# Patient Record
Sex: MALE | Race: WHITE | Employment: FULL TIME | URBAN - METROPOLITAN AREA
[De-identification: names, ages, dates, MRNs, and addresses within clinical notes are randomized per-mention and may not be internally consistent; named-entity substitution may affect disease eponyms.]

---

## 2018-01-20 ENCOUNTER — HOSPITAL ENCOUNTER (EMERGENCY)
Age: 43
Discharge: HOME | End: 2018-01-20
Attending: EMERGENCY MEDICINE
Payer: COMMERCIAL

## 2018-01-20 ENCOUNTER — APPOINTMENT (OUTPATIENT)
Dept: GENERAL RADIOLOGY | Age: 43
End: 2018-01-20
Attending: EMERGENCY MEDICINE
Payer: COMMERCIAL

## 2018-01-20 VITALS
WEIGHT: 291.23 LBS | BODY MASS INDEX: 35.46 KG/M2 | RESPIRATION RATE: 18 BRPM | HEIGHT: 76 IN | HEART RATE: 85 BPM | SYSTOLIC BLOOD PRESSURE: 135 MMHG | DIASTOLIC BLOOD PRESSURE: 94 MMHG | OXYGEN SATURATION: 97 % | TEMPERATURE: 97.9 F

## 2018-01-20 DIAGNOSIS — K13.79 UVULAR EDEMA: Primary | ICD-10-CM

## 2018-01-20 DIAGNOSIS — J02.9 PHARYNGITIS, UNSPECIFIED ETIOLOGY: ICD-10-CM

## 2018-01-20 LAB
ALBUMIN SERPL-MCNC: 3.8 G/DL (ref 3.5–5)
ALBUMIN/GLOB SERPL: 0.9 {RATIO} (ref 1.1–2.2)
ALP SERPL-CCNC: 81 U/L (ref 45–117)
ALT SERPL-CCNC: 52 U/L (ref 12–78)
ANION GAP SERPL CALC-SCNC: 6 MMOL/L (ref 5–15)
AST SERPL-CCNC: 20 U/L (ref 15–37)
BASOPHILS # BLD: 0 K/UL (ref 0–0.1)
BASOPHILS NFR BLD: 0 % (ref 0–1)
BILIRUB SERPL-MCNC: 0.4 MG/DL (ref 0.2–1)
BUN SERPL-MCNC: 17 MG/DL (ref 6–20)
BUN/CREAT SERPL: 19 (ref 12–20)
CALCIUM SERPL-MCNC: 9.1 MG/DL (ref 8.5–10.1)
CHLORIDE SERPL-SCNC: 104 MMOL/L (ref 97–108)
CO2 SERPL-SCNC: 28 MMOL/L (ref 21–32)
CREAT SERPL-MCNC: 0.91 MG/DL (ref 0.7–1.3)
D DIMER PPP FEU-MCNC: <0.17 MG/L FEU (ref 0–0.65)
EOSINOPHIL # BLD: 0.1 K/UL (ref 0–0.4)
EOSINOPHIL NFR BLD: 1 % (ref 0–7)
ERYTHROCYTE [DISTWIDTH] IN BLOOD BY AUTOMATED COUNT: 13.6 % (ref 11.5–14.5)
GLOBULIN SER CALC-MCNC: 4.1 G/DL (ref 2–4)
GLUCOSE SERPL-MCNC: 102 MG/DL (ref 65–100)
HCT VFR BLD AUTO: 42.6 % (ref 36.6–50.3)
HGB BLD-MCNC: 14.5 G/DL (ref 12.1–17)
LYMPHOCYTES # BLD: 1.4 K/UL (ref 0.8–3.5)
LYMPHOCYTES NFR BLD: 20 % (ref 12–49)
MCH RBC QN AUTO: 30.3 PG (ref 26–34)
MCHC RBC AUTO-ENTMCNC: 34 G/DL (ref 30–36.5)
MCV RBC AUTO: 89.1 FL (ref 80–99)
MONOCYTES # BLD: 0.5 K/UL (ref 0–1)
MONOCYTES NFR BLD: 8 % (ref 5–13)
NEUTS SEG # BLD: 4.9 K/UL (ref 1.8–8)
NEUTS SEG NFR BLD: 71 % (ref 32–75)
PLATELET # BLD AUTO: 233 K/UL (ref 150–400)
POTASSIUM SERPL-SCNC: 4.2 MMOL/L (ref 3.5–5.1)
PROT SERPL-MCNC: 7.9 G/DL (ref 6.4–8.2)
RBC # BLD AUTO: 4.78 M/UL (ref 4.1–5.7)
SODIUM SERPL-SCNC: 138 MMOL/L (ref 136–145)
WBC # BLD AUTO: 7 K/UL (ref 4.1–11.1)

## 2018-01-20 PROCEDURE — 36415 COLL VENOUS BLD VENIPUNCTURE: CPT | Performed by: EMERGENCY MEDICINE

## 2018-01-20 PROCEDURE — 80053 COMPREHEN METABOLIC PANEL: CPT | Performed by: EMERGENCY MEDICINE

## 2018-01-20 PROCEDURE — 71046 X-RAY EXAM CHEST 2 VIEWS: CPT

## 2018-01-20 PROCEDURE — 74011250636 HC RX REV CODE- 250/636: Performed by: EMERGENCY MEDICINE

## 2018-01-20 PROCEDURE — 85025 COMPLETE CBC W/AUTO DIFF WBC: CPT | Performed by: EMERGENCY MEDICINE

## 2018-01-20 PROCEDURE — 96375 TX/PRO/DX INJ NEW DRUG ADDON: CPT

## 2018-01-20 PROCEDURE — 99282 EMERGENCY DEPT VISIT SF MDM: CPT

## 2018-01-20 PROCEDURE — 96374 THER/PROPH/DIAG INJ IV PUSH: CPT

## 2018-01-20 PROCEDURE — 70360 X-RAY EXAM OF NECK: CPT

## 2018-01-20 PROCEDURE — 85379 FIBRIN DEGRADATION QUANT: CPT | Performed by: EMERGENCY MEDICINE

## 2018-01-20 RX ORDER — DEXAMETHASONE SODIUM PHOSPHATE 10 MG/ML
10 INJECTION INTRAMUSCULAR; INTRAVENOUS
Status: COMPLETED | OUTPATIENT
Start: 2018-01-20 | End: 2018-01-20

## 2018-01-20 RX ORDER — PREDNISONE 10 MG/1
60 TABLET ORAL
Qty: 27 TAB | Refills: 0 | Status: SHIPPED | OUTPATIENT
Start: 2018-01-20 | End: 2021-05-21

## 2018-01-20 RX ORDER — KETOROLAC TROMETHAMINE 30 MG/ML
30 INJECTION, SOLUTION INTRAMUSCULAR; INTRAVENOUS
Status: COMPLETED | OUTPATIENT
Start: 2018-01-20 | End: 2018-01-20

## 2018-01-20 RX ORDER — AMOXICILLIN AND CLAVULANATE POTASSIUM 875; 125 MG/1; MG/1
1 TABLET, FILM COATED ORAL 2 TIMES DAILY
Qty: 7 TAB | Refills: 0 | Status: SHIPPED | OUTPATIENT
Start: 2018-01-20 | End: 2018-01-27

## 2018-01-20 RX ORDER — DIPHENHYDRAMINE HYDROCHLORIDE 50 MG/ML
25 INJECTION, SOLUTION INTRAMUSCULAR; INTRAVENOUS
Status: COMPLETED | OUTPATIENT
Start: 2018-01-20 | End: 2018-01-20

## 2018-01-20 RX ADMIN — KETOROLAC TROMETHAMINE 30 MG: 30 INJECTION, SOLUTION INTRAMUSCULAR at 08:30

## 2018-01-20 RX ADMIN — DEXAMETHASONE SODIUM PHOSPHATE 10 MG: 10 INJECTION, SOLUTION INTRAMUSCULAR; INTRAVENOUS at 07:40

## 2018-01-20 RX ADMIN — DIPHENHYDRAMINE HYDROCHLORIDE 25 MG: 50 INJECTION, SOLUTION INTRAMUSCULAR; INTRAVENOUS at 08:30

## 2018-01-20 NOTE — ED PROVIDER NOTES
EMERGENCY DEPARTMENT HISTORY AND PHYSICAL EXAM      Date: 1/20/2018  Patient Name: Steve Paul    History of Presenting Illness     Chief Complaint   Patient presents with    Cough     x today with red streaks noted Pt states he drank [place] P[employer] because he was trying to think of something that would clear his airway. Patient doesn't drink normally    Cold Symptoms     Congestion in the past 2 weeks       History Provided By: Patient    HPI: Steve Paul, 43 y.o. male with PMHx significant for GERD, anxiety, and hypercholesterolemia, presents ambulatory to the ED with cc of cough with significant blood in sputum, rhinorrhea and SOB since waking up this morning with a dry mouth. Pt denies a hx of similar symptoms, however notes visualizing mildly blood tinged mucous from [employer] drainage intermittently. He reports having electric heat in the house, and he notes his concerns for its association with his symptoms. Pt also notes some mild ear drainage bilaterally last night. He notes drinking a sip of bourbon this morning in an attempt to relieve his symptoms. He denies any daily alcohol use, or tobacco use. Pt endorses being well hydrated. Pt denies any recent unusual bruising, or a hx of GI problems. He endorses a close fhx of esophageal CA and hypertension. Pt denies any recent dental work. He denies any hx of PE/DVT, hormone replacement therapy, prolonged immobilization, or recent surgeries. Pt specifically denies nay abdominal pain, fever, or chest pain. PCP: Constance Odonnell NP    There are no other complaints, changes, or physical findings at this time.         Past History     Past Medical History:  Past Medical History:   Diagnosis Date    Anxiety     Depression     depression and adhd    GERD (gastroesophageal reflux disease)     Hypercholesterolemia        Past Surgical History:  Past Surgical History:   Procedure Laterality Date    HX VASECTOMY      2003       Family History:  Family History   Problem Relation Age of Onset    Cancer Father     Elevated Lipids Mother     Cancer Maternal Grandfather        Social History:  Social History   Substance Use Topics    Smoking status: Never Smoker    Smokeless tobacco: Never Used    Alcohol use 0.6 oz/week     1 Shots of liquor per week      Comment: social       Allergies:  No Known Allergies      Review of Systems   Review of Systems   HENT: Positive for rhinorrhea. Respiratory: Positive for cough and shortness of breath. Cardiovascular: Negative for chest pain. Gastrointestinal: Negative for abdominal pain. All other systems reviewed and are negative. Physical Exam   Physical Exam  Vital signs and nursing notes reviewed    CONSTITUTIONAL: Alert, in mild distress; well-developed; well-nourished. musculature build  HEAD:  Normocephalic, atraumatic  EYES: PERRL; EOM's intact. ENTM: Nose: no rhinorrhea; Throat: no erythema or exudate, mucous membranes moist; no lip swelling or tongue swelling, no sublingual buccal or mucosal swelling in the mouth except for uvula  Neck:  Supple. trachea is midline. RESP: Chest clear, equal breath sounds. - W/R/R  CV: S1 and S2 WNL; No murmurs, gallops or rubs. 2+ radial and DP pulses bilaterally. GI: non-distended, normal bowel sounds, abdomen soft and non-tender. No masses or organomegaly. : No costo-vertebral angle tenderness. BACK:  Non-tender, normal appearance  UPPER EXT:  Normal inspection. no joint or soft tissue swelling  LOWER EXT: No edema, no calf tenderness. NEURO: Alert and oriented x3, 5/5 strength and light touch sensation intact in bilateral upper and lower extremities. SKIN: No rashes;  Warm and dry  PSYCH: Normal mood, normal affect    Diagnostic Study Results     Labs -     Recent Results (from the past 12 hour(s))   CBC WITH AUTOMATED DIFF    Collection Time: 01/20/18  6:54 AM   Result Value Ref Range    WBC 7.0 4.1 - 11.1 K/uL    RBC 4.78 4.10 - 5.70 M/uL    HGB 14.5 12.1 - 17.0 g/dL    HCT 42.6 36.6 - 50.3 %    MCV 89.1 80.0 - 99.0 FL    MCH 30.3 26.0 - 34.0 PG    MCHC 34.0 30.0 - 36.5 g/dL    RDW 13.6 11.5 - 14.5 %    PLATELET 530 146 - 712 K/uL    NEUTROPHILS 71 32 - 75 %    LYMPHOCYTES 20 12 - 49 %    MONOCYTES 8 5 - 13 %    EOSINOPHILS 1 0 - 7 %    BASOPHILS 0 0 - 1 %    ABS. NEUTROPHILS 4.9 1.8 - 8.0 K/UL    ABS. LYMPHOCYTES 1.4 0.8 - 3.5 K/UL    ABS. MONOCYTES 0.5 0.0 - 1.0 K/UL    ABS. EOSINOPHILS 0.1 0.0 - 0.4 K/UL    ABS. BASOPHILS 0.0 0.0 - 0.1 K/UL   METABOLIC PANEL, COMPREHENSIVE    Collection Time: 01/20/18  6:54 AM   Result Value Ref Range    Sodium 138 136 - 145 mmol/L    Potassium 4.2 3.5 - 5.1 mmol/L    Chloride 104 97 - 108 mmol/L    CO2 28 21 - 32 mmol/L    Anion gap 6 5 - 15 mmol/L    Glucose 102 (H) 65 - 100 mg/dL    BUN 17 6 - 20 MG/DL    Creatinine 0.91 0.70 - 1.30 MG/DL    BUN/Creatinine ratio 19 12 - 20      GFR est AA >60 >60 ml/min/1.73m2    GFR est non-AA >60 >60 ml/min/1.73m2    Calcium 9.1 8.5 - 10.1 MG/DL    Bilirubin, total 0.4 0.2 - 1.0 MG/DL    ALT (SGPT) 52 12 - 78 U/L    AST (SGOT) 20 15 - 37 U/L    Alk. phosphatase 81 45 - 117 U/L    Protein, total 7.9 6.4 - 8.2 g/dL    Albumin 3.8 3.5 - 5.0 g/dL    Globulin 4.1 (H) 2.0 - 4.0 g/dL    A-G Ratio 0.9 (L) 1.1 - 2.2     D DIMER    Collection Time: 01/20/18  6:57 AM   Result Value Ref Range    D-dimer <0.17 0.00 - 0.65 mg/L FEU       Radiologic Studies -     CXR Results  (Last 48 hours)               01/20/18 0648  XR CHEST PA LAT Final result    Impression:  IMPRESSION:   No acute process. Narrative:  INDICATION:   acute cough with red streaks in it; eval for infiltrate       COMPARISON: None       FINDINGS:       Frontal and lateral views of the chest demonstrate a normal cardiomediastinal   silhouette. The lungs are adequately expanded. There is no edema, effusion,   consolidation, or pneumothorax. The osseous structures are unremarkable.                    Medical Decision Making   I am the first provider for this patient. I reviewed the vital signs, available nursing notes, past medical history, past surgical history, family history and social history. Vital Signs-Reviewed the patient's vital signs. Patient Vitals for the past 12 hrs:   Temp Pulse Resp BP SpO2   01/20/18 0653 - - - - 95 %   01/20/18 0649 - - - (!) 135/94 -   01/20/18 0634 97.9 °F (36.6 °C) 85 18 (!) 135/94 98 %       Pulse Oximetry Analysis - 98% on room air    Cardiac Monitor:   Rate: 86 bpm    Records Reviewed: Nursing Notes and Old Medical Records    Provider Notes (Medical Decision Making):   42 yo male with report of coughing up lots of blood in a setting of recent dry mouth at night here with no fever hypotension, tachycardia, tachypnea or respiratory distress. Patient without acute nose bleed or visible bleeding in the oral pharyngis, or uvula and posterior oral pharynx appears irritated possibly from dry air and forced heat in home. No evidence of airway compromise or new bleeding on exam. Patient has no history of GI bleed or other GI problems to suggest GI cause. No evidence of PNA. Reassuring vitals and low D-dimer for PE. Plan of discharge for patient. Recommend humidifier to use for dry oropharyngeal and nasopharyngeal  symptoms. Discussed return precautions with patient and his wife. ED Course:   7:01 AM  Initial assessment performed. The patients presenting problems have been discussed, and they are in agreement with the care plan formulated and outlined with them. I have encouraged them to ask questions as they arise throughout their visit. 8:58 AM  Re-examed uvula no change in size continues to appear inflamed, pt is not in respiratory distressed, still has a sense of fullness of oropharynx, no visible blood, sending for soft tissue of the neck to evaluated for edema of the epiglottis.     Disposition:  DISCHARGE NOTE:  9:24 AM  The patient has been re-evaluated and is ready for discharge. Reviewed available results with patient. Counseled patient on diagnosis and care plan. Patient has expressed understanding, and all questions have been answered. Patient agrees with plan and agrees to follow up as recommended, or return to the ED if their symptoms worsen. Discharge instructions have been provided and explained to the patient, along with reasons to return to the ED. PLAN:  1. There are no discharge medications for this patient. 2.   Follow-up Information     Follow up With Details Comments Suman Vasquez NP In 2 days for re-evaluation. [address] [place] [address]  [phone]      [facility] EMERGENCY DEPT  If symptoms worsen including new coughing of blood, new uncontrolled nosebleed, difficulty breathing, sensation of throat closing up. [address] [place] [address]  [phone]        Return to ED if worse     Diagnosis     Clinical Impression:   1. Uvular edema    2. Pharyngitis, unspecified etiology        Attestations:  ATTESTATION:  This note is prepared by Migue Humphries, acting as Scribe for Shawn Erickson MD.     Shawn Erickson MD: The scribe's documentation has been prepared under my direction and personally reviewed by me in its entirety. I confirm that the note above accurately reflects all work, treatment, procedures, and medical decision making performed by me.

## 2018-01-20 NOTE — ED NOTES
Discharge instructions reviewed w/ pt and copy given by Dr. Eddi Brian. Pt discharged ambulatory from ED to care of wife.

## 2018-01-20 NOTE — LETTER
[address] 
[facility] EMERGENCY DEPT 
[address] [place] [address] [address] [id number] 
[phone] Work/School Note Date: 1/20/2018 To Whom It May concern: 
 
Drew Hurtado was seen and treated today in the emergency room by the following provider(s): 
Attending Provider: Anastacia Bergeron MD. Drew Hurtado Please excuse from work on 1/20/18 and 1/21/18. Sincerely, Anastacia Bergeron MD

## 2021-05-19 ENCOUNTER — HOSPITAL ENCOUNTER (EMERGENCY)
Age: 46
Discharge: HOME | End: 2021-05-19
Attending: STUDENT IN AN ORGANIZED HEALTH CARE EDUCATION/TRAINING PROGRAM
Payer: COMMERCIAL

## 2021-05-19 ENCOUNTER — APPOINTMENT (OUTPATIENT)
Dept: CT IMAGING | Age: 46
End: 2021-05-19
Attending: PHYSICIAN ASSISTANT
Payer: COMMERCIAL

## 2021-05-19 VITALS
WEIGHT: 292.77 LBS | RESPIRATION RATE: 16 BRPM | BODY MASS INDEX: 36.4 KG/M2 | HEART RATE: 98 BPM | DIASTOLIC BLOOD PRESSURE: 81 MMHG | SYSTOLIC BLOOD PRESSURE: 145 MMHG | HEIGHT: 75 IN | OXYGEN SATURATION: 96 % | TEMPERATURE: 98 F

## 2021-05-19 DIAGNOSIS — K52.9 GASTROENTERITIS, ACUTE: Primary | ICD-10-CM

## 2021-05-19 LAB
ALBUMIN SERPL-MCNC: 3.7 G/DL (ref 3.5–5)
ALBUMIN/GLOB SERPL: 0.8 {RATIO} (ref 1.1–2.2)
ALP SERPL-CCNC: 68 U/L (ref 45–117)
ALT SERPL-CCNC: 51 U/L (ref 12–78)
ANION GAP SERPL CALC-SCNC: 2 MMOL/L (ref 5–15)
APPEARANCE UR: CLEAR
AST SERPL-CCNC: 24 U/L (ref 15–37)
BACTERIA URNS QL MICRO: NEGATIVE /HPF
BASOPHILS # BLD: 0 K/UL (ref 0–0.1)
BASOPHILS NFR BLD: 0 % (ref 0–1)
BILIRUB SERPL-MCNC: 0.8 MG/DL (ref 0.2–1)
BILIRUB UR QL CFM: NEGATIVE
BUN SERPL-MCNC: 11 MG/DL (ref 6–20)
BUN/CREAT SERPL: 9 (ref 12–20)
CALCIUM SERPL-MCNC: 9.6 MG/DL (ref 8.5–10.1)
CHLORIDE SERPL-SCNC: 101 MMOL/L (ref 97–108)
CO2 SERPL-SCNC: 32 MMOL/L (ref 21–32)
COLOR UR: ABNORMAL
CREAT SERPL-MCNC: 1.22 MG/DL (ref 0.7–1.3)
DIFFERENTIAL METHOD BLD: ABNORMAL
EOSINOPHIL # BLD: 0.2 K/UL (ref 0–0.4)
EOSINOPHIL NFR BLD: 1 % (ref 0–7)
EPITH CASTS URNS QL MICRO: ABNORMAL /LPF
ERYTHROCYTE [DISTWIDTH] IN BLOOD BY AUTOMATED COUNT: 15.4 % (ref 11.5–14.5)
GLOBULIN SER CALC-MCNC: 4.5 G/DL (ref 2–4)
GLUCOSE SERPL-MCNC: 104 MG/DL (ref 65–100)
GLUCOSE UR STRIP.AUTO-MCNC: NEGATIVE MG/DL
HCT VFR BLD AUTO: 53.6 % (ref 36.6–50.3)
HGB BLD-MCNC: 17.9 G/DL (ref 12.1–17)
HGB UR QL STRIP: ABNORMAL
HYALINE CASTS URNS QL MICRO: ABNORMAL /LPF (ref 0–5)
IMM GRANULOCYTES # BLD AUTO: 0 K/UL (ref 0–0.04)
IMM GRANULOCYTES NFR BLD AUTO: 0 % (ref 0–0.5)
KETONES UR QL STRIP.AUTO: ABNORMAL MG/DL
LEUKOCYTE ESTERASE UR QL STRIP.AUTO: NEGATIVE
LIPASE SERPL-CCNC: 43 U/L (ref 73–393)
LYMPHOCYTES # BLD: 0.6 K/UL (ref 0.8–3.5)
LYMPHOCYTES NFR BLD: 4 % (ref 12–49)
MCH RBC QN AUTO: 30.7 PG (ref 26–34)
MCHC RBC AUTO-ENTMCNC: 33.4 G/DL (ref 30–36.5)
MCV RBC AUTO: 91.9 FL (ref 80–99)
MONOCYTES # BLD: 1.1 K/UL (ref 0–1)
MONOCYTES NFR BLD: 7 % (ref 5–13)
NEUTS SEG # BLD: 13.4 K/UL (ref 1.8–8)
NEUTS SEG NFR BLD: 88 % (ref 32–75)
NITRITE UR QL STRIP.AUTO: NEGATIVE
NRBC # BLD: 0 K/UL (ref 0–0.01)
NRBC BLD-RTO: 0 PER 100 WBC
PH UR STRIP: 5.5 [PH] (ref 5–8)
PLATELET # BLD AUTO: 218 K/UL (ref 150–400)
PMV BLD AUTO: 10.5 FL (ref 8.9–12.9)
POTASSIUM SERPL-SCNC: 4.3 MMOL/L (ref 3.5–5.1)
PROT SERPL-MCNC: 8.2 G/DL (ref 6.4–8.2)
PROT UR STRIP-MCNC: 30 MG/DL
RBC # BLD AUTO: 5.83 M/UL (ref 4.1–5.7)
RBC #/AREA URNS HPF: ABNORMAL /HPF (ref 0–5)
RBC MORPH BLD: ABNORMAL
SODIUM SERPL-SCNC: 135 MMOL/L (ref 136–145)
SP GR UR REFRACTOMETRY: 1.03 (ref 1–1.03)
TROPONIN I SERPL-MCNC: <0.05 NG/ML
UA: UC IF INDICATED,UAUC: ABNORMAL
UROBILINOGEN UR QL STRIP.AUTO: 1 EU/DL (ref 0.2–1)
WBC # BLD AUTO: 15.3 K/UL (ref 4.1–11.1)
WBC MORPH BLD: ABNORMAL
WBC URNS QL MICRO: ABNORMAL /HPF (ref 0–4)

## 2021-05-19 PROCEDURE — 74011250636 HC RX REV CODE- 250/636: Performed by: PHYSICIAN ASSISTANT

## 2021-05-19 PROCEDURE — 96375 TX/PRO/DX INJ NEW DRUG ADDON: CPT

## 2021-05-19 PROCEDURE — 74177 CT ABD & PELVIS W/CONTRAST: CPT

## 2021-05-19 PROCEDURE — 96374 THER/PROPH/DIAG INJ IV PUSH: CPT

## 2021-05-19 PROCEDURE — 36415 COLL VENOUS BLD VENIPUNCTURE: CPT

## 2021-05-19 PROCEDURE — 80053 COMPREHEN METABOLIC PANEL: CPT

## 2021-05-19 PROCEDURE — 74011000636 HC RX REV CODE- 636: Performed by: STUDENT IN AN ORGANIZED HEALTH CARE EDUCATION/TRAINING PROGRAM

## 2021-05-19 PROCEDURE — 99283 EMERGENCY DEPT VISIT LOW MDM: CPT

## 2021-05-19 PROCEDURE — 84484 ASSAY OF TROPONIN QUANT: CPT

## 2021-05-19 PROCEDURE — 81001 URINALYSIS AUTO W/SCOPE: CPT

## 2021-05-19 PROCEDURE — 85025 COMPLETE CBC W/AUTO DIFF WBC: CPT

## 2021-05-19 PROCEDURE — 96361 HYDRATE IV INFUSION ADD-ON: CPT

## 2021-05-19 PROCEDURE — 83690 ASSAY OF LIPASE: CPT

## 2021-05-19 RX ORDER — SODIUM CHLORIDE 9 MG/ML
1000 INJECTION, SOLUTION INTRAVENOUS ONCE
Status: COMPLETED | OUTPATIENT
Start: 2021-05-19 | End: 2021-05-19

## 2021-05-19 RX ORDER — DICYCLOMINE HYDROCHLORIDE 20 MG/1
20 TABLET ORAL EVERY 6 HOURS
Qty: 20 TABLET | Refills: 0 | OUTPATIENT
Start: 2021-05-19 | End: 2022-03-21

## 2021-05-19 RX ORDER — KETOROLAC TROMETHAMINE 30 MG/ML
15 INJECTION, SOLUTION INTRAMUSCULAR; INTRAVENOUS
Status: COMPLETED | OUTPATIENT
Start: 2021-05-19 | End: 2021-05-19

## 2021-05-19 RX ORDER — ONDANSETRON 2 MG/ML
4 INJECTION INTRAMUSCULAR; INTRAVENOUS
Status: COMPLETED | OUTPATIENT
Start: 2021-05-19 | End: 2021-05-19

## 2021-05-19 RX ORDER — ONDANSETRON 4 MG/1
4 TABLET, ORALLY DISINTEGRATING ORAL
Qty: 20 TABLET | Refills: 0 | Status: SHIPPED | OUTPATIENT
Start: 2021-05-19

## 2021-05-19 RX ADMIN — SODIUM CHLORIDE 1000 ML: 9 INJECTION, SOLUTION INTRAVENOUS at 09:51

## 2021-05-19 RX ADMIN — IOPAMIDOL 100 ML: 755 INJECTION, SOLUTION INTRAVENOUS at 10:36

## 2021-05-19 RX ADMIN — ONDANSETRON 4 MG: 2 INJECTION INTRAMUSCULAR; INTRAVENOUS at 09:55

## 2021-05-19 RX ADMIN — KETOROLAC TROMETHAMINE 15 MG: 30 INJECTION, SOLUTION INTRAMUSCULAR at 09:54

## 2021-05-19 NOTE — ED PROVIDER NOTES
EMERGENCY DEPARTMENT HISTORY AND PHYSICAL EXAM      Date: 5/19/2021  Patient Name: Alannah Harvey    History of Presenting Illness     Chief Complaint   Patient presents with    Abdominal Pain     Patient started last night having diarrhea and abdomnal pain that has progressively gotten worse.  Diarrhea       History Provided By: Patient and Patient's Wife    HPI: Alannah Harvey, 39 y.o. male with PMHx significant for GERD, hypercholesterolemia, presents to the ED with cc of abdominal pain onset since last night. He describes it as intermittent, sharp pain that initially began in his pelvis and is now radiating into his upper abdomen. The pain comes on in waves. He has had associated nausea and nonbloody diarrhea. The pain is severe when present. It slightly improves after an episode of diarrhea. He denies fever, vomiting, urinary symptoms. He denies recent eating out or new food exposures. There are no other complaints, changes, or physical findings at this time. PCP: Traci Chaudhary NP    No current facility-administered medications on file prior to encounter. Current Outpatient Medications on File Prior to Encounter   Medication Sig Dispense Refill    predniSONE (DELTASONE) 10 mg tablet Take 6 Tabs by mouth daily (with breakfast).  Day 1 and 2: 60mg; Day 3: 50mg; Day 4:40mg; Day 5: 30 mg; Day 6: 20mg; Day 7: 10mg 27 Tab 0       Past History     Past Medical History:  Past Medical History:   Diagnosis Date    Anxiety     Depression     depression and adhd    GERD (gastroesophageal reflux disease)     Hypercholesterolemia        Past Surgical History:  Past Surgical History:   Procedure Laterality Date    HX VASECTOMY      2003       Family History:  Family History   Problem Relation Age of Onset    Cancer Father     Elevated Lipids Mother     Cancer Maternal Grandfather        Social History:  Social History     Tobacco Use    Smoking status: Never Smoker    Smokeless tobacco: Never Used   Substance Use Topics    Alcohol use: Yes     Alcohol/week: 1.0 standard drinks     Types: 1 Shots of liquor per week     Comment: social    Drug use: No       Allergies:  No Known Allergies      Review of Systems   Review of Systems   Constitutional: Negative for chills and fever. HENT: Negative for ear pain and sore throat. Eyes: Negative for redness and visual disturbance. Respiratory: Negative for cough and shortness of breath. Cardiovascular: Negative for chest pain and palpitations. Gastrointestinal: Positive for abdominal pain, diarrhea and nausea. Negative for blood in stool and vomiting. Genitourinary: Negative for dysuria and hematuria. Musculoskeletal: Negative for back pain and gait problem. Skin: Negative for rash and wound. Neurological: Negative for dizziness and headaches. Psychiatric/Behavioral: Negative for behavioral problems and confusion. All other systems reviewed and are negative. Physical Exam   Physical Exam  Constitutional:       Appearance: He is not toxic-appearing. Comments: Patient is slightly uncomfortable appearing. HENT:      Head: Normocephalic and atraumatic. Mouth/Throat:      Mouth: Mucous membranes are moist.   Eyes:      Extraocular Movements: Extraocular movements intact. Pupils: Pupils are equal, round, and reactive to light. Cardiovascular:      Rate and Rhythm: Normal rate and regular rhythm. Heart sounds: Normal heart sounds. No murmur heard. Pulmonary:      Effort: Pulmonary effort is normal. No respiratory distress. Breath sounds: Normal breath sounds. No wheezing, rhonchi or rales. Abdominal:      Comments: Abdomen is soft. There is tenderness palpation in the periumbilical and epigastric areas. No rebound or guarding. No CVA tenderness. Musculoskeletal:         General: No deformity. Normal range of motion. Cervical back: Normal range of motion and neck supple.    Skin: General: Skin is warm and dry. Neurological:      General: No focal deficit present. Mental Status: He is alert and oriented to person, place, and time. Psychiatric:         Behavior: Behavior normal.           Diagnostic Study Results     Labs -     Recent Results (from the past 12 hour(s))   URINALYSIS W/ REFLEX CULTURE    Collection Time: 05/19/21  8:33 AM    Specimen: Urine    Urine specimen   Result Value Ref Range    Color DARK YELLOW      Appearance CLEAR CLEAR      Specific gravity 1.027 1.003 - 1.030      pH (UA) 5.5 5.0 - 8.0      Protein 30 (A) NEG mg/dL    Glucose Negative NEG mg/dL    Ketone TRACE (A) NEG mg/dL    Blood MODERATE (A) NEG      Urobilinogen 1.0 0.2 - 1.0 EU/dL    Nitrites Negative NEG      Leukocyte Esterase Negative NEG      WBC 0-4 0 - 4 /hpf    RBC 5-10 0 - 5 /hpf    Epithelial cells FEW FEW /lpf    Bacteria Negative NEG /hpf    UA:UC IF INDICATED CULTURE NOT INDICATED BY UA RESULT CNI      Hyaline cast 0-2 0 - 5 /lpf   BILIRUBIN, CONFIRM    Collection Time: 05/19/21  8:33 AM   Result Value Ref Range    Bilirubin UA, confirm Negative NEG     CBC WITH AUTOMATED DIFF    Collection Time: 05/19/21  8:48 AM   Result Value Ref Range    WBC 15.3 (H) 4.1 - 11.1 K/uL    RBC 5.83 (H) 4.10 - 5.70 M/uL    HGB 17.9 (H) 12.1 - 17.0 g/dL    HCT 53.6 (H) 36.6 - 50.3 %    MCV 91.9 80.0 - 99.0 FL    MCH 30.7 26.0 - 34.0 PG    MCHC 33.4 30.0 - 36.5 g/dL    RDW 15.4 (H) 11.5 - 14.5 %    PLATELET 483 964 - 626 K/uL    MPV 10.5 8.9 - 12.9 FL    NRBC 0.0 0  WBC    ABSOLUTE NRBC 0.00 0.00 - 0.01 K/uL    NEUTROPHILS 88 (H) 32 - 75 %    LYMPHOCYTES 4 (L) 12 - 49 %    MONOCYTES 7 5 - 13 %    EOSINOPHILS 1 0 - 7 %    BASOPHILS 0 0 - 1 %    IMMATURE GRANULOCYTES 0 0.0 - 0.5 %    ABS. NEUTROPHILS 13.4 (H) 1.8 - 8.0 K/UL    ABS. LYMPHOCYTES 0.6 (L) 0.8 - 3.5 K/UL    ABS. MONOCYTES 1.1 (H) 0.0 - 1.0 K/UL    ABS. EOSINOPHILS 0.2 0.0 - 0.4 K/UL    ABS. BASOPHILS 0.0 0.0 - 0.1 K/UL    ABS. IMM. GRANS. 0.0 0.00 - 0.04 K/UL    DF SMEAR SCANNED      RBC COMMENTS ANISOCYTOSIS  1+        WBC COMMENTS TOXIC GRANULATION     METABOLIC PANEL, COMPREHENSIVE    Collection Time: 05/19/21  8:48 AM   Result Value Ref Range    Sodium 135 (L) 136 - 145 mmol/L    Potassium 4.3 3.5 - 5.1 mmol/L    Chloride 101 97 - 108 mmol/L    CO2 32 21 - 32 mmol/L    Anion gap 2 (L) 5 - 15 mmol/L    Glucose 104 (H) 65 - 100 mg/dL    BUN 11 6 - 20 MG/DL    Creatinine 1.22 0.70 - 1.30 MG/DL    BUN/Creatinine ratio 9 (L) 12 - 20      GFR est AA >60 >60 ml/min/1.73m2    GFR est non-AA >60 >60 ml/min/1.73m2    Calcium 9.6 8.5 - 10.1 MG/DL    Bilirubin, total 0.8 0.2 - 1.0 MG/DL    ALT (SGPT) 51 12 - 78 U/L    AST (SGOT) 24 15 - 37 U/L    Alk. phosphatase 68 45 - 117 U/L    Protein, total 8.2 6.4 - 8.2 g/dL    Albumin 3.7 3.5 - 5.0 g/dL    Globulin 4.5 (H) 2.0 - 4.0 g/dL    A-G Ratio 0.8 (L) 1.1 - 2.2     LIPASE    Collection Time: 05/19/21  8:48 AM   Result Value Ref Range    Lipase 43 (L) 73 - 393 U/L   TROPONIN I    Collection Time: 05/19/21  8:48 AM   Result Value Ref Range    Troponin-I, Qt. <0.05 <0.05 ng/mL       Radiologic Studies -   CT ABD PELV W CONT   Final Result   Mild splenomegaly. No acute abnormality. CT Results  (Last 48 hours)               05/19/21 1037  CT ABD PELV W CONT Final result    Impression:  Mild splenomegaly. No acute abnormality. Narrative:  EXAM: CT ABD PELV W CONT       INDICATION: intermittent severe abdominal pain with diarrhea and leukocytosis       COMPARISON: None        CONTRAST: 100 mL of Isovue-370. TECHNIQUE:    Following the uneventful intravenous administration of contrast, thin axial   images were obtained through the abdomen and pelvis. Coronal and sagittal   reconstructions were generated. Oral contrast was not administered. CT dose   reduction was achieved through use of a standardized protocol tailored for this   examination and automatic exposure control for dose modulation. FINDINGS:    LOWER THORAX: No significant abnormality in the incidentally imaged lower chest.   LIVER: No mass. BILIARY TREE: Gallbladder is within normal limits. CBD is not dilated. SPLEEN: The spleen is mildly enlarged, maximum transverse dimension 14.1 cm. PANCREAS: No mass or ductal dilatation. ADRENALS: Unremarkable. KIDNEYS: No mass, calculus, or hydronephrosis. STOMACH: Unremarkable. SMALL BOWEL: No dilatation or wall thickening. COLON: No dilatation or wall thickening. APPENDIX: Within normal limits. PERITONEUM: No ascites or pneumoperitoneum. RETROPERITONEUM: No lymphadenopathy or aortic aneurysm. REPRODUCTIVE ORGANS: There is no pelvic mass or lymphadenopathy. URINARY BLADDER: No mass or calculus. BONES: No destructive bone lesion. ABDOMINAL WALL: No mass or hernia. ADDITIONAL COMMENTS: N/A               CXR Results  (Last 48 hours)    None            Medical Decision Making   I am the first provider for this patient. I reviewed the vital signs, available nursing notes, past medical history, past surgical history, family history and social history. Vital Signs-Reviewed the patient's vital signs. Patient Vitals for the past 12 hrs:   Temp Pulse Resp BP SpO2   05/19/21 0827 98 °F (36.7 °C) 98 16 (!) 145/81 96 %         Records Reviewed: Nursing Notes, Old Medical Records, Previous Radiology Studies and Previous Laboratory Studies      Provider Notes (Medical Decision Making):   DDx: Gastroenteritis, infectious colitis, diverticulitis, appendicitis, dehydration, electrolyte abnormality    Plan to obtain labs, CT abdomen/pelvis for further evaluation. Will treat with IV fluids, antiemetic, and analgesia. ED Course:   Initial assessment performed. The patients presenting problems have been discussed, and they are in agreement with the care plan formulated and outlined with them. I have encouraged them to ask questions as they arise throughout their visit. 11:25 AM -CT shows mild splenomegaly but no acute abnormality. Patient was treated symptomatically with IV fluids, antiemetic, and analgesia with improvement in symptoms. He felt better on recheck. Advised BRAT diet and supportive treatment at home. Advised follow up with PCP for a recheck and stool studies if symptoms continue and discussed return precautions. Disposition:  11:28 AM  The patient has been re-evaluated and is ready for discharge. Reviewed available results with patient. Counseled patient on diagnosis and care plan. Patient has expressed understanding, and all questions have been answered. Patient agrees with plan and agrees to follow up as recommended, or to return to the ED if their symptoms worsen. Discharge instructions have been provided and explained to the patient, along with reasons to return to the ED. PLAN:  1. Discharge Medication List as of 5/19/2021 11:28 AM      START taking these medications    Details   ondansetron (Zofran ODT) 4 mg disintegrating tablet 1 Tablet by SubLINGual route every eight (8) hours as needed for Nausea or Vomiting., Normal, Disp-20 Tablet, R-0      dicyclomine (BENTYL) 20 mg tablet Take 1 Tablet by mouth every six (6) hours. , Normal, Disp-20 Tablet, R-0         CONTINUE these medications which have NOT CHANGED    Details   predniSONE (DELTASONE) 10 mg tablet Take 6 Tabs by mouth daily (with breakfast). Day 1 and 2: 60mg; Day 3: 50mg; Day 4:40mg; Day 5: 30 mg; Day 6: 20mg; Day 7: 10mg, Print, Disp-27 Tab, R-0           2.    Follow-up Information     Follow up With Specialties Details Why Contact Info    Ulises Lewis NP Nurse Practitioner Schedule an appointment as soon as possible for a visit in 1 week for a recheck [address] [facility]  [phone]      [facility] EMERGENCY DEPT Emergency Medicine Go to  If symptoms worsen [address]  [address] [facility]  [phone]        Return to ED if worse Diagnosis     Clinical Impression:   1. Gastroenteritis, acute            [place].  NAYELY Ross

## 2021-05-19 NOTE — ED NOTES
Luis Tariq has reviewed discharge instructions with the patient. The patient verbalized understanding. Patient made aware of prescriptions to be picked up at pharmacy. Patient ambulatory out of ED at this time.

## 2021-05-19 NOTE — LETTER
[address] 
[facility] EMERGENCY DEPT 
[address] [facility] [place] [address] 
[phone] Work/School Note Date: 5/19/2021 To Whom It May concern: 
 
Mary Jane Sood was seen and treated today in the emergency room by the following provider(s): 
Attending Provider: Maria Del Carmen Muse MD 
Physician Assistant: JOSE Villagran. Please excuse him from work on May 20, 2021.  
 
Sincerely, 
 
 
 
 
JOSE Butler

## 2021-05-21 ENCOUNTER — HOSPITAL ENCOUNTER (EMERGENCY)
Age: 46
Discharge: HOME | End: 2021-05-21
Attending: EMERGENCY MEDICINE
Payer: COMMERCIAL

## 2021-05-21 VITALS
OXYGEN SATURATION: 95 % | DIASTOLIC BLOOD PRESSURE: 72 MMHG | BODY MASS INDEX: 36.13 KG/M2 | SYSTOLIC BLOOD PRESSURE: 138 MMHG | HEIGHT: 75 IN | HEART RATE: 86 BPM | TEMPERATURE: 98.3 F | RESPIRATION RATE: 18 BRPM | WEIGHT: 290.57 LBS

## 2021-05-21 DIAGNOSIS — J10.1 INFLUENZA A: Primary | ICD-10-CM

## 2021-05-21 LAB
ALBUMIN SERPL-MCNC: 3.1 G/DL (ref 3.5–5)
ALBUMIN/GLOB SERPL: 0.7 {RATIO} (ref 1.1–2.2)
ALP SERPL-CCNC: 57 U/L (ref 45–117)
ALT SERPL-CCNC: 53 U/L (ref 12–78)
ANION GAP SERPL CALC-SCNC: 4 MMOL/L (ref 5–15)
APPEARANCE UR: CLEAR
AST SERPL-CCNC: 28 U/L (ref 15–37)
BACTERIA URNS QL MICRO: ABNORMAL /HPF
BILIRUB SERPL-MCNC: 0.6 MG/DL (ref 0.2–1)
BILIRUB UR QL CFM: POSITIVE
BUN SERPL-MCNC: 12 MG/DL (ref 6–20)
BUN/CREAT SERPL: 10 (ref 12–20)
CALCIUM SERPL-MCNC: 8.6 MG/DL (ref 8.5–10.1)
CHLORIDE SERPL-SCNC: 98 MMOL/L (ref 97–108)
CO2 SERPL-SCNC: 31 MMOL/L (ref 21–32)
COLOR UR: ABNORMAL
CREAT SERPL-MCNC: 1.15 MG/DL (ref 0.7–1.3)
EPITH CASTS URNS QL MICRO: ABNORMAL /LPF
ERYTHROCYTE [DISTWIDTH] IN BLOOD BY AUTOMATED COUNT: 14.8 % (ref 11.5–14.5)
FLUAV AG NPH QL IA: POSITIVE
FLUBV AG NOSE QL IA: NEGATIVE
GLOBULIN SER CALC-MCNC: 4.6 G/DL (ref 2–4)
GLUCOSE SERPL-MCNC: 96 MG/DL (ref 65–100)
GLUCOSE UR STRIP.AUTO-MCNC: NEGATIVE MG/DL
HCT VFR BLD AUTO: 49.3 % (ref 36.6–50.3)
HGB BLD-MCNC: 16.7 G/DL (ref 12.1–17)
HGB UR QL STRIP: ABNORMAL
KETONES UR QL STRIP.AUTO: 15 MG/DL
LEUKOCYTE ESTERASE UR QL STRIP.AUTO: ABNORMAL
LIPASE SERPL-CCNC: 44 U/L (ref 73–393)
MCH RBC QN AUTO: 30.6 PG (ref 26–34)
MCHC RBC AUTO-ENTMCNC: 33.9 G/DL (ref 30–36.5)
MCV RBC AUTO: 90.5 FL (ref 80–99)
MUCOUS THREADS URNS QL MICRO: ABNORMAL /LPF
NITRITE UR QL STRIP.AUTO: NEGATIVE
NRBC # BLD: 0 K/UL (ref 0–0.01)
NRBC BLD-RTO: 0 PER 100 WBC
PH UR STRIP: 5.5 [PH] (ref 5–8)
PLATELET # BLD AUTO: 213 K/UL (ref 150–400)
PMV BLD AUTO: 10.7 FL (ref 8.9–12.9)
POTASSIUM SERPL-SCNC: 3.9 MMOL/L (ref 3.5–5.1)
PROT SERPL-MCNC: 7.7 G/DL (ref 6.4–8.2)
PROT UR STRIP-MCNC: 100 MG/DL
RBC # BLD AUTO: 5.45 M/UL (ref 4.1–5.7)
RBC #/AREA URNS HPF: ABNORMAL /HPF (ref 0–5)
SODIUM SERPL-SCNC: 133 MMOL/L (ref 136–145)
SP GR UR REFRACTOMETRY: 1.03 (ref 1–1.03)
UA: UC IF INDICATED,UAUC: ABNORMAL
UROBILINOGEN UR QL STRIP.AUTO: 0.2 EU/DL (ref 0.2–1)
WBC # BLD AUTO: 12.6 K/UL (ref 4.1–11.1)
WBC URNS QL MICRO: ABNORMAL /HPF (ref 0–4)

## 2021-05-21 PROCEDURE — 87804 INFLUENZA ASSAY W/OPTIC: CPT

## 2021-05-21 PROCEDURE — 83690 ASSAY OF LIPASE: CPT

## 2021-05-21 PROCEDURE — U0005 INFEC AGEN DETEC AMPLI PROBE: HCPCS

## 2021-05-21 PROCEDURE — U0003 INFECTIOUS AGENT DETECTION BY NUCLEIC ACID (DNA OR RNA); SEVERE ACUTE RESPIRATORY SYNDROME CORONAVIRUS 2 (SARS-COV-2) (CORONAVIRUS DISEASE [COVID-19]), AMPLIFIED PROBE TECHNIQUE, MAKING USE OF HIGH THROUGHPUT TECHNOLOGIES AS DESCRIBED BY CMS-2020-01-R: HCPCS

## 2021-05-21 PROCEDURE — 96361 HYDRATE IV INFUSION ADD-ON: CPT

## 2021-05-21 PROCEDURE — 85027 COMPLETE CBC AUTOMATED: CPT

## 2021-05-21 PROCEDURE — 87086 URINE CULTURE/COLONY COUNT: CPT

## 2021-05-21 PROCEDURE — 80053 COMPREHEN METABOLIC PANEL: CPT

## 2021-05-21 PROCEDURE — 96360 HYDRATION IV INFUSION INIT: CPT

## 2021-05-21 PROCEDURE — 36415 COLL VENOUS BLD VENIPUNCTURE: CPT

## 2021-05-21 PROCEDURE — 99283 EMERGENCY DEPT VISIT LOW MDM: CPT

## 2021-05-21 PROCEDURE — 74011250637 HC RX REV CODE- 250/637: Performed by: EMERGENCY MEDICINE

## 2021-05-21 PROCEDURE — 74011250636 HC RX REV CODE- 250/636: Performed by: EMERGENCY MEDICINE

## 2021-05-21 PROCEDURE — 81001 URINALYSIS AUTO W/SCOPE: CPT

## 2021-05-21 RX ORDER — ACETAMINOPHEN 500 MG
1000 TABLET ORAL
Status: COMPLETED | OUTPATIENT
Start: 2021-05-21 | End: 2021-05-21

## 2021-05-21 RX ORDER — LOPERAMIDE HYDROCHLORIDE 2 MG/1
2 CAPSULE ORAL
Qty: 20 CAPSULE | Refills: 0 | Status: SHIPPED | OUTPATIENT
Start: 2021-05-21 | End: 2021-05-26

## 2021-05-21 RX ADMIN — SODIUM CHLORIDE 1000 ML: 9 INJECTION, SOLUTION INTRAVENOUS at 10:14

## 2021-05-21 RX ADMIN — ACETAMINOPHEN 1000 MG: 500 TABLET ORAL at 10:14

## 2021-05-21 RX ADMIN — SODIUM CHLORIDE 1000 ML: 9 INJECTION, SOLUTION INTRAVENOUS at 12:43

## 2021-05-21 NOTE — ED PROVIDER NOTES
EMERGENCY DEPARTMENT HISTORY AND PHYSICAL EXAM          Date: 5/21/2021  Patient Name: Ana Edmond    History of Presenting Illness     Chief Complaint   Patient presents with    Diarrhea     Diarrhea and abd pain that started Tuesday night. He was here and they didn't find anything. Now he has a fever. History Provided By: Patient    HPI: Ana Edmond is a 39 y.o. male, pmhx GERD, anxiety and depression, high cholesterol, who presents ambulatory to the ED c/o fever and diarrhea    Patient states he started with diarrhea on Tuesday. He was seen here in the ER on Wednesday had a normal CT and lab work. Later that night he started spiking fevers at 99.8 and 100.1. Since that time he states he just still does not feel well and anytime he drinks something it comes right out his diarrhea. He denies any blood in his stool as well as any vomiting but notes that he does have a headache as well as nausea. He has no sick contacts, recent travel or recent antibiotic use. He notes nobody in the house is having diarrhea. Patient has not been vaccinated against coronavirus. PCP: Kilo Jackson NP    Allergies: nkda  Social Hx: -tobacco, -vaping, +EtOH, -Illicit Drugs; There are no other complaints, changes, or physical findings at this time. Current Outpatient Medications   Medication Sig Dispense Refill    loperamide (IMODIUM) 2 mg capsule Take 1 Capsule by mouth four (4) times daily as needed for Diarrhea. 20 Capsule 0    ondansetron (Zofran ODT) 4 mg disintegrating tablet 1 Tablet by SubLINGual route every eight (8) hours as needed for Nausea or Vomiting. 20 Tablet 0    dicyclomine (BENTYL) 20 mg tablet Take 1 Tablet by mouth every six (6) hours.  20 Tablet 0       Past History     Past Medical History:  Past Medical History:   Diagnosis Date    Anxiety     Depression     depression and adhd    GERD (gastroesophageal reflux disease)     Hypercholesterolemia        Past Surgical History:  Past Surgical History:   Procedure Laterality Date    HX VASECTOMY      2003       Family History:  Family History   Problem Relation Age of Onset    Cancer Father     Elevated Lipids Mother     Cancer Maternal Grandfather        Social History:  Social History     Tobacco Use    Smoking status: Never Smoker    Smokeless tobacco: Never Used   Substance Use Topics    Alcohol use: Yes     Alcohol/week: 1.0 standard drinks     Types: 1 Shots of liquor per week     Comment: social    Drug use: No       Allergies:  No Known Allergies      Review of Systems   Review of Systems   Constitutional: Positive for appetite change, fatigue and fever. Negative for activity change, chills and unexpected weight change. HENT: Negative for congestion. Eyes: Negative for pain and visual disturbance. Respiratory: Negative for cough and shortness of breath. Cardiovascular: Negative for chest pain. Gastrointestinal: Positive for abdominal pain (Longer has the sharp shooting pain that he had on Tuesday but he states his abdomen just feels tender), diarrhea and nausea. Negative for blood in stool and vomiting. Genitourinary: Negative for dysuria. Musculoskeletal: Positive for arthralgias and myalgias. Negative for back pain. Skin: Negative for rash. Neurological: Negative for headaches. Physical Exam   Physical Exam  Vitals and nursing note reviewed. Constitutional:       Appearance: He is well-developed. He is not diaphoretic. Comments: Obese middle-aged male currently in minimal acute distress with normal vital signs. HENT:      Head: Normocephalic and atraumatic. Eyes:      General:         Right eye: No discharge. Left eye: No discharge. Conjunctiva/sclera: Conjunctivae normal.      Pupils: Pupils are equal, round, and reactive to light. Cardiovascular:      Rate and Rhythm: Normal rate and regular rhythm. Heart sounds: Normal heart sounds.  No murmur heard.   No friction rub. No gallop. Pulmonary:      Effort: Pulmonary effort is normal. No respiratory distress. Breath sounds: Normal breath sounds. No wheezing or rales. Abdominal:      General: Bowel sounds are normal. There is no distension. Palpations: Abdomen is soft. There is no mass. Tenderness: There is no abdominal tenderness. There is no guarding or rebound. Hernia: No hernia is present. Musculoskeletal:         General: Normal range of motion. Cervical back: Normal range of motion and neck supple. Right lower leg: No edema. Left lower leg: No edema. Skin:     General: Skin is warm and dry. Findings: No rash. Neurological:      Mental Status: He is alert and oriented to person, place, and time. Cranial Nerves: No cranial nerve deficit. Motor: No abnormal muscle tone. Diagnostic Study Results     Labs -     No results found for this or any previous visit (from the past 12 hour(s)). Radiologic Studies -   No orders to display     CT Results  (Last 48 hours)    None        CXR Results  (Last 48 hours)    None            Medical Decision Making   I am the first provider for this patient. I reviewed the vital signs, available nursing notes, past medical history, past surgical history, family history and social history. Vital Signs-Reviewed the patient's vital signs. No data found. Pulse Oximetry Analysis - 98% on RA    Records Reviewed: Nursing Notes, Old Medical Records, Previous Radiology Studies and Previous Laboratory Studies    Provider Notes (Medical Decision Making):   MDM: Obese middle-aged male currently in minimal acute distress with normal vital signs presenting not improved after his visit yesterday after taking Bentyl and Zofran. Patient is hemodynamically stable with low concern for sepsis. He had normal labs and CT obtained on Tuesday except for a white count that was slightly elevated.   Labs already obtained from triage today IV fluid to be provided will make in droplet precautions and send Covid/flu testing. ED Course:   Initial assessment performed. The patients presenting problems have been discussed, and they are in agreement with the care plan formulated and outlined with them. I have encouraged them to ask questions as they arise throughout their visit. PROGRESS NOTE:    ED Course as of May 22 1125   Fri May 21, 2021   1213 SARS-CoV-2: Please find results under separate order. Influenza A notably positive. Discussed with patient and his family. He has not urinated after 1 L of fluids. Second liter to be provided prior to discharge. [JT]      ED Course User Index  [JT] Nils Benavides MD        Discharge note:  Pt re-evaluated and noted to be feeling better, ready for discharge. Updated pt on all final lab findings. Will follow up as instructed. All questions have been answered, pt voiced understanding and agreement with plan. Specific return precautions provided as well as instructions to return to the ED should sx worsen at any time. Vital signs stable for discharge. Critical Care Time:   0      Diagnosis     Clinical Impression:   1. Influenza A        PLAN:  1. Discharge Medication List as of 5/21/2021 12:23 PM      START taking these medications    Details   loperamide (IMODIUM) 2 mg capsule Take 1 Capsule by mouth four (4) times daily as needed for Diarrhea., Normal, Disp-20 Capsule, R-0         CONTINUE these medications which have NOT CHANGED    Details   ondansetron (Zofran ODT) 4 mg disintegrating tablet 1 Tablet by SubLINGual route every eight (8) hours as needed for Nausea or Vomiting., Normal, Disp-20 Tablet, R-0      dicyclomine (BENTYL) 20 mg tablet Take 1 Tablet by mouth every six (6) hours. , Normal, Disp-20 Tablet, R-0           2.    Follow-up Information     Follow up With Specialties Details Why Contact Info    Ania Jaffe NP Nurse Practitioner Schedule an appointment as soon as possible for a visit   [address] [place] [address]  [phone]      \[facility]\"" EMERGENCY DEPT Emergency Medicine  If symptoms worsen [address]  [address] [facility]  [phone]        Return to ED if worse     Disposition:  Home       Please note, this dictation was completed with [employer], the computer voice recognition software. Quite often unanticipated grammatical, syntax, homophones, and other interpretive errors are inadvertently transcribed by the computer software. Please disregard these errors. Please excuse any errors that have escaped final proof reading.

## 2021-05-22 ENCOUNTER — PATIENT OUTREACH (OUTPATIENT)
Dept: CASE MANAGEMENT | Age: 46
End: 2021-05-22

## 2021-05-22 LAB
BACTERIA SPEC CULT: NORMAL
SARS-COV-2, COV2: NORMAL
SERVICE CMNT-IMP: NORMAL

## 2021-05-22 NOTE — PROGRESS NOTES
Contacted patient for COVID Suspect follow up. Call within 2 business days of discharge: Yes   Attempted to reach patient by telephone. Made two call attempts. Recieved [employer] wireless message stating number is invalid. Made Several attempts to reach patient today. Attempts were unsuccessful. Episode resolved.

## 2021-05-23 LAB — SARS-COV-2, COV2NT: NOT DETECTED

## 2021-05-25 ENCOUNTER — HOSPITAL ENCOUNTER (EMERGENCY)
Age: 46
Discharge: HOME | End: 2021-05-26
Attending: EMERGENCY MEDICINE
Payer: COMMERCIAL

## 2021-05-25 ENCOUNTER — APPOINTMENT (OUTPATIENT)
Dept: CT IMAGING | Age: 46
End: 2021-05-25
Attending: EMERGENCY MEDICINE
Payer: COMMERCIAL

## 2021-05-25 DIAGNOSIS — R06.02 SOB (SHORTNESS OF BREATH): ICD-10-CM

## 2021-05-25 DIAGNOSIS — J10.1 INFLUENZA A: Primary | ICD-10-CM

## 2021-05-25 DIAGNOSIS — R06.09 DOE (DYSPNEA ON EXERTION): ICD-10-CM

## 2021-05-25 DIAGNOSIS — R19.7 DIARRHEA, UNSPECIFIED TYPE: ICD-10-CM

## 2021-05-25 LAB
ALBUMIN SERPL-MCNC: 2.9 G/DL (ref 3.5–5)
ALBUMIN/GLOB SERPL: 0.6 {RATIO} (ref 1.1–2.2)
ALP SERPL-CCNC: 95 U/L (ref 45–117)
ALT SERPL-CCNC: 102 U/L (ref 12–78)
ANION GAP SERPL CALC-SCNC: 1 MMOL/L (ref 5–15)
APPEARANCE UR: ABNORMAL
ARTERIAL PATENCY WRIST A: YES
AST SERPL-CCNC: 36 U/L (ref 15–37)
BACTERIA URNS QL MICRO: NEGATIVE /HPF
BASE EXCESS BLDA CALC-SCNC: 3.4 MMOL/L
BASOPHILS # BLD: 0 K/UL (ref 0–0.1)
BASOPHILS NFR BLD: 0 % (ref 0–1)
BDY SITE: ABNORMAL
BILIRUB SERPL-MCNC: 0.7 MG/DL (ref 0.2–1)
BILIRUB UR QL CFM: NEGATIVE
BNP SERPL-MCNC: 2517 PG/ML
BUN SERPL-MCNC: 15 MG/DL (ref 6–20)
BUN/CREAT SERPL: 11 (ref 12–20)
CALCIUM SERPL-MCNC: 8.6 MG/DL (ref 8.5–10.1)
CHLORIDE SERPL-SCNC: 97 MMOL/L (ref 97–108)
CO2 SERPL-SCNC: 35 MMOL/L (ref 21–32)
COLOR UR: ABNORMAL
CREAT SERPL-MCNC: 1.33 MG/DL (ref 0.7–1.3)
DIFFERENTIAL METHOD BLD: ABNORMAL
EOSINOPHIL # BLD: 0 K/UL (ref 0–0.4)
EOSINOPHIL NFR BLD: 0 % (ref 0–7)
EPITH CASTS URNS QL MICRO: ABNORMAL /LPF
ERYTHROCYTE [DISTWIDTH] IN BLOOD BY AUTOMATED COUNT: 15 % (ref 11.5–14.5)
GLOBULIN SER CALC-MCNC: 4.6 G/DL (ref 2–4)
GLUCOSE SERPL-MCNC: 117 MG/DL (ref 65–100)
GLUCOSE UR STRIP.AUTO-MCNC: NEGATIVE MG/DL
GRAN CASTS URNS QL MICRO: ABNORMAL /LPF
HCO3 BLDA-SCNC: 28 MMOL/L (ref 22–26)
HCT VFR BLD AUTO: 49.4 % (ref 36.6–50.3)
HGB BLD-MCNC: 16.6 G/DL (ref 12.1–17)
HGB UR QL STRIP: ABNORMAL
HYALINE CASTS URNS QL MICRO: ABNORMAL /LPF (ref 0–5)
IMM GRANULOCYTES # BLD AUTO: 0 K/UL (ref 0–0.04)
IMM GRANULOCYTES NFR BLD AUTO: 0 % (ref 0–0.5)
KETONES UR QL STRIP.AUTO: 15 MG/DL
LEUKOCYTE ESTERASE UR QL STRIP.AUTO: ABNORMAL
LYMPHOCYTES # BLD: 2 K/UL (ref 0.8–3.5)
LYMPHOCYTES NFR BLD: 23 % (ref 12–49)
MCH RBC QN AUTO: 30.2 PG (ref 26–34)
MCHC RBC AUTO-ENTMCNC: 33.6 G/DL (ref 30–36.5)
MCV RBC AUTO: 89.8 FL (ref 80–99)
MONOCYTES # BLD: 1.2 K/UL (ref 0–1)
MONOCYTES NFR BLD: 14 % (ref 5–13)
MUCOUS THREADS URNS QL MICRO: ABNORMAL /LPF
NEUTS BAND NFR BLD MANUAL: 9 %
NEUTS SEG # BLD: 5.4 K/UL (ref 1.8–8)
NEUTS SEG NFR BLD: 54 % (ref 32–75)
NITRITE UR QL STRIP.AUTO: POSITIVE
NRBC # BLD: 0 K/UL (ref 0–0.01)
NRBC BLD-RTO: 0 PER 100 WBC
OTHER,OTHU: ABNORMAL
PCO2 BLDA: 42 MMHG (ref 35–45)
PH BLDA: 7.44 [PH] (ref 7.35–7.45)
PH UR STRIP: 5.5 [PH] (ref 5–8)
PLATELET # BLD AUTO: 298 K/UL (ref 150–400)
PLATELET COMMENTS,PCOM: ABNORMAL
PMV BLD AUTO: 9.9 FL (ref 8.9–12.9)
PO2 BLDA: 62 MMHG (ref 80–100)
POTASSIUM SERPL-SCNC: 4 MMOL/L (ref 3.5–5.1)
PROT SERPL-MCNC: 7.5 G/DL (ref 6.4–8.2)
PROT UR STRIP-MCNC: 100 MG/DL
RBC # BLD AUTO: 5.5 M/UL (ref 4.1–5.7)
RBC #/AREA URNS HPF: ABNORMAL /HPF (ref 0–5)
RBC MORPH BLD: ABNORMAL
SAO2 % BLD: 93 % (ref 92–97)
SAO2% DEVICE SAO2% SENSOR NAME: ABNORMAL
SODIUM SERPL-SCNC: 133 MMOL/L (ref 136–145)
SP GR UR REFRACTOMETRY: 1.02 (ref 1–1.03)
SPECIMEN SITE: ABNORMAL
TROPONIN I BLD-MCNC: 0.06 NG/ML (ref 0–0.08)
UA: UC IF INDICATED,UAUC: ABNORMAL
UROBILINOGEN UR QL STRIP.AUTO: 1 EU/DL (ref 0.2–1)
WBC # BLD AUTO: 8.6 K/UL (ref 4.1–11.1)
WBC URNS QL MICRO: ABNORMAL /HPF (ref 0–4)
YEAST URNS QL MICRO: PRESENT

## 2021-05-25 PROCEDURE — 83880 ASSAY OF NATRIURETIC PEPTIDE: CPT

## 2021-05-25 PROCEDURE — 80053 COMPREHEN METABOLIC PANEL: CPT

## 2021-05-25 PROCEDURE — 36600 WITHDRAWAL OF ARTERIAL BLOOD: CPT

## 2021-05-25 PROCEDURE — 96374 THER/PROPH/DIAG INJ IV PUSH: CPT

## 2021-05-25 PROCEDURE — 71275 CT ANGIOGRAPHY CHEST: CPT

## 2021-05-25 PROCEDURE — 82803 BLOOD GASES ANY COMBINATION: CPT

## 2021-05-25 PROCEDURE — 85025 COMPLETE CBC W/AUTO DIFF WBC: CPT

## 2021-05-25 PROCEDURE — 74011000636 HC RX REV CODE- 636: Performed by: EMERGENCY MEDICINE

## 2021-05-25 PROCEDURE — 99285 EMERGENCY DEPT VISIT HI MDM: CPT

## 2021-05-25 PROCEDURE — 36415 COLL VENOUS BLD VENIPUNCTURE: CPT

## 2021-05-25 PROCEDURE — 96375 TX/PRO/DX INJ NEW DRUG ADDON: CPT

## 2021-05-25 PROCEDURE — 93005 ELECTROCARDIOGRAM TRACING: CPT

## 2021-05-25 PROCEDURE — 74011250636 HC RX REV CODE- 250/636: Performed by: EMERGENCY MEDICINE

## 2021-05-25 PROCEDURE — 81001 URINALYSIS AUTO W/SCOPE: CPT

## 2021-05-25 PROCEDURE — 84484 ASSAY OF TROPONIN QUANT: CPT

## 2021-05-25 RX ORDER — DICYCLOMINE HYDROCHLORIDE 20 MG/1
20 TABLET ORAL
Status: DISCONTINUED | OUTPATIENT
Start: 2021-05-25 | End: 2021-05-26 | Stop reason: HOSPADM

## 2021-05-25 RX ORDER — ONDANSETRON 2 MG/ML
4 INJECTION INTRAMUSCULAR; INTRAVENOUS
Status: DISCONTINUED | OUTPATIENT
Start: 2021-05-25 | End: 2021-05-26 | Stop reason: HOSPADM

## 2021-05-25 RX ORDER — KETOROLAC TROMETHAMINE 30 MG/ML
15 INJECTION, SOLUTION INTRAMUSCULAR; INTRAVENOUS
Status: COMPLETED | OUTPATIENT
Start: 2021-05-25 | End: 2021-05-25

## 2021-05-25 RX ADMIN — KETOROLAC TROMETHAMINE 15 MG: 30 INJECTION, SOLUTION INTRAMUSCULAR at 22:19

## 2021-05-25 RX ADMIN — ONDANSETRON 4 MG: 2 INJECTION INTRAMUSCULAR; INTRAVENOUS at 21:43

## 2021-05-25 RX ADMIN — IOPAMIDOL 100 ML: 755 INJECTION, SOLUTION INTRAVENOUS at 21:35

## 2021-05-25 RX ADMIN — METHYLPREDNISOLONE SODIUM SUCCINATE 125 MG: 125 INJECTION, POWDER, FOR SOLUTION INTRAMUSCULAR; INTRAVENOUS at 22:32

## 2021-05-25 RX ADMIN — SODIUM CHLORIDE 1000 ML: 9 INJECTION, SOLUTION INTRAVENOUS at 22:21

## 2021-05-25 NOTE — ED PROVIDER NOTES
EMERGENCY DEPARTMENT HISTORY AND PHYSICAL EXAM     ------------------------------------------------------------------------------------------------------  Please note that this dictation was completed with [employer], the [employer] voice recognition software. Quite often unanticipated grammatical, syntax, homophones, and other interpretive errors are inadvertently transcribed by the computer software. Please disregard these errors. Please excuse any errors that have escaped final proofreading.  -----------------------------------------------------------------------------------------------------------------    Date: 5/25/2021  Patient Name: Dalia Mena    History of Presenting Illness     Chief Complaint   Patient presents with    Shortness of Breath     Patient diagnosed on Friday with influenza A in this ER. Patient today is complaining of increased shortness of breath and fevers. Patient has been sweating alot and taking tylenol and ibuprofen for the fevers. Last took ibuprofen at 1730. Patient has had poor appetite and when he is up moving around his oxygen saturation drops to 87-88. History Provided By: Patient and significant other    HPI: Dalia Mena is a 39 y.o. male, with significant pmhx of recent diagnosis of influenza A, who presents via private vehicle to the ED with c/o shortness of breath with noted hypoxia on pulse ox at home. Patient was recently seen in our emergency department on 2 separate occasions and most recently diagnosed with influenza A 4 days ago. Notes that he continues to have intermittent fevers which he has been taking Tylenol ibuprofen for. Notes that after taking ibuprofen he often breaks out in a sweat for some time. Denies having issues with diarrhea and has abdominal cramping every time he tries to eat something. Denies nausea or vomiting. Patient was reportedly getting more short of breath with minimal activity around the house and talking.   Significant other checked his oxygen level with a pulse ox and was noted to be at 86% after walking around the house. Was able to return back to low 90s upon resting for several minutes. Denies any associated chest pain with his shortness of breath. Pt also specifically denies any recent urinary sxs, or headache. PCP: Farhad Pavon NP    Social Hx: denies tobacco, denies EtOH, denies Illicit Drugs     There are no other complaints, changes, or physical findings at this time. No Known Allergies      Current Facility-Administered Medications   Medication Dose Route Frequency Provider Last Rate Last Admin    ondansetron (ZOFRAN) injection 4 mg  4 mg IntraVENous Q1H PRN Retta Krabbe, MD   4 mg at 05/25/21 2143    dicyclomine (BENTYL) tablet 20 mg  20 mg Oral NOW Retta Krabbe, MD         Current Outpatient Medications   Medication Sig Dispense Refill    diphenoxylate-atropine (LomotiL) 2.5-0.025 mg per tablet Take 1 Tablet by mouth four (4) times daily as needed for Diarrhea (1 tab after each stool for max 8 per day). Max Daily Amount: 4 Tablets. Take after each stool for a maximum of 8 tablets daily 20 Tablet 0    hyoscyamine SL (LEVSIN/SL) 0.125 mg SL tablet 1 Tablet by SubLINGual route every four (4) hours as needed for Cramping. 10 Tablet 0    promethazine (PHENERGAN) 25 mg tablet Take 1 Tablet by mouth every six (6) hours as needed for Nausea. 12 Tablet 0    predniSONE (STERAPRED DS) 10 mg dose pack Take as directed on packaging 48 Tablet 0    naproxen (NAPROSYN) 500 mg tablet Take 1 Tablet by mouth every twelve (12) hours as needed for Pain. 20 Tablet 0    ondansetron (Zofran ODT) 4 mg disintegrating tablet 1 Tablet by SubLINGual route every eight (8) hours as needed for Nausea or Vomiting. 20 Tablet 0    dicyclomine (BENTYL) 20 mg tablet Take 1 Tablet by mouth every six (6) hours.  20 Tablet 0       Past History     Past Medical History:  Past Medical History:   Diagnosis Date    Anxiety     Depression     depression and adhd    GERD (gastroesophageal reflux disease)     Hypercholesterolemia        Past Surgical History:  Past Surgical History:   Procedure Laterality Date    HX VASECTOMY      2003       Family History:  Family History   Problem Relation Age of Onset    Cancer Father     Elevated Lipids Mother     Cancer Maternal Grandfather        Social History:  Social History     Tobacco Use    Smoking status: Never Smoker    Smokeless tobacco: Never Used   Substance Use Topics    Alcohol use: Yes     Alcohol/week: 1.0 standard drinks     Types: 1 Shots of liquor per week     Comment: social    Drug use: No       Allergies:  No Known Allergies      Review of Systems   Review of Systems   Constitutional: Positive for fatigue and fever. Negative for chills. HENT: Negative. Eyes: Negative. Respiratory: Positive for shortness of breath. Negative for cough and chest tightness. Cardiovascular: Negative for chest pain and leg swelling. Gastrointestinal: Positive for abdominal pain and diarrhea. Negative for nausea and vomiting. Endocrine: Negative. Genitourinary: Negative for difficulty urinating and dysuria. Musculoskeletal: Negative for myalgias. Skin: Negative. Neurological: Negative. Psychiatric/Behavioral: Negative. All other systems reviewed and are negative. Physical Exam   Physical Exam  Vitals and nursing note reviewed. Constitutional:       General: He is not in acute distress. Appearance: He is well-developed. He is not diaphoretic. Comments: Elevated BMI   HENT:      Head: Normocephalic and atraumatic. Nose: Nose normal.      Mouth/Throat:      Pharynx: No oropharyngeal exudate. Eyes:      Conjunctiva/sclera: Conjunctivae normal.      Pupils: Pupils are equal, round, and reactive to light. Neck:      Vascular: No JVD. Cardiovascular:      Rate and Rhythm: Normal rate and regular rhythm. Heart sounds: Normal heart sounds.  No murmur heard. No friction rub. Pulmonary:      Effort: Pulmonary effort is normal. No respiratory distress. Breath sounds: Normal breath sounds. No stridor. No wheezing or rales. Abdominal:      General: Bowel sounds are normal. There is no distension. Palpations: Abdomen is soft. Tenderness: There is no abdominal tenderness. There is no rebound. Musculoskeletal:         General: No tenderness. Normal range of motion. Cervical back: Normal range of motion and neck supple. Skin:     General: Skin is warm and dry. Findings: No rash. Neurological:      Mental Status: He is alert and oriented to person, place, and time. Cranial Nerves: No cranial nerve deficit. Psychiatric:         Speech: Speech normal.         Behavior: Behavior normal.         Thought Content: Thought content normal.         Judgment: Judgment normal.           Diagnostic Study Results     Labs -     Recent Results (from the past 12 hour(s))   CBC WITH AUTOMATED DIFF    Collection Time: 05/25/21  8:30 PM   Result Value Ref Range    WBC 8.6 4.1 - 11.1 K/uL    RBC 5.50 4. 10 - 5.70 M/uL    HGB 16.6 12.1 - 17.0 g/dL    HCT 49.4 36.6 - 50.3 %    MCV 89.8 80.0 - 99.0 FL    MCH 30.2 26.0 - 34.0 PG    MCHC 33.6 30.0 - 36.5 g/dL    RDW 15.0 (H) 11.5 - 14.5 %    PLATELET 373 527 - 627 K/uL    MPV 9.9 8.9 - 12.9 FL    NRBC 0.0 0  WBC    ABSOLUTE NRBC 0.00 0.00 - 0.01 K/uL    NEUTROPHILS 54 32 - 75 %    BAND NEUTROPHILS 9 %    LYMPHOCYTES 23 12 - 49 %    MONOCYTES 14 (H) 5 - 13 %    EOSINOPHILS 0 0 - 7 %    BASOPHILS 0 0 - 1 %    IMMATURE GRANULOCYTES 0 0.0 - 0.5 %    ABS. NEUTROPHILS 5.4 1.8 - 8.0 K/UL    ABS. LYMPHOCYTES 2.0 0.8 - 3.5 K/UL    ABS. MONOCYTES 1.2 (H) 0.0 - 1.0 K/UL    ABS. EOSINOPHILS 0.0 0.0 - 0.4 K/UL    ABS. BASOPHILS 0.0 0.0 - 0.1 K/UL    ABS. IMM.  GRANS. 0.0 0.00 - 0.04 K/UL    DF MANUAL      PLATELET COMMENTS Large Platelets      RBC COMMENTS NORMOCYTIC, NORMOCHROMIC     METABOLIC PANEL, COMPREHENSIVE    Collection Time: 05/25/21  8:30 PM   Result Value Ref Range    Sodium 133 (L) 136 - 145 mmol/L    Potassium 4.0 3.5 - 5.1 mmol/L    Chloride 97 97 - 108 mmol/L    CO2 35 (H) 21 - 32 mmol/L    Anion gap 1 (L) 5 - 15 mmol/L    Glucose 117 (H) 65 - 100 mg/dL    BUN 15 6 - 20 MG/DL    Creatinine 1.33 (H) 0.70 - 1.30 MG/DL    BUN/Creatinine ratio 11 (L) 12 - 20      GFR est AA >60 >60 ml/min/1.73m2    GFR est non-AA 58 (L) >60 ml/min/1.73m2    Calcium 8.6 8.5 - 10.1 MG/DL    Bilirubin, total 0.7 0.2 - 1.0 MG/DL    ALT (SGPT) 102 (H) 12 - 78 U/L    AST (SGOT) 36 15 - 37 U/L    Alk.  phosphatase 95 45 - 117 U/L    Protein, total 7.5 6.4 - 8.2 g/dL    Albumin 2.9 (L) 3.5 - 5.0 g/dL    Globulin 4.6 (H) 2.0 - 4.0 g/dL    A-G Ratio 0.6 (L) 1.1 - 2.2     NT-PRO BNP    Collection Time: 05/25/21  8:30 PM   Result Value Ref Range    NT pro-BNP 2,517 (H) <125 PG/ML   URINALYSIS W/ REFLEX CULTURE    Collection Time: 05/25/21  8:30 PM    Specimen: Miscellaneous sample    Urine specimen   Result Value Ref Range    Color ABBY      Appearance CLOUDY (A) CLEAR      Specific gravity 1.025 1.003 - 1.030      pH (UA) 5.5 5.0 - 8.0      Protein 100 (A) NEG mg/dL    Glucose Negative NEG mg/dL    Ketone 15 (A) NEG mg/dL    Blood LARGE (A) NEG      Urobilinogen 1.0 0.2 - 1.0 EU/dL    Nitrites Positive (A) NEG      Leukocyte Esterase SMALL (A) NEG      WBC 5-10 0 - 4 /hpf    RBC 10-20 0 - 5 /hpf    Epithelial cells FEW FEW /lpf    Bacteria Negative NEG /hpf    UA:UC IF INDICATED CULTURE NOT INDICATED BY UA RESULT CNI      Mucus 2+ (A) NEG /lpf    Hyaline cast 0-2 0 - 5 /lpf    Granular cast 5-10 (A) NEG /lpf    Yeast PRESENT (A) NEG      Other: Renal Epithelial cells Present     BILIRUBIN, CONFIRM    Collection Time: 05/25/21  8:30 PM   Result Value Ref Range    Bilirubin UA, confirm Negative NEG     EKG, 12 LEAD, INITIAL    Collection Time: 05/25/21  8:51 PM   Result Value Ref Range    Ventricular Rate 86 BPM    Atrial Rate 86 BPM    P-R Interval 140 ms    QRS Duration 106 ms    Q-T Interval 370 ms    QTC Calculation (Bezet) 442 ms    Calculated P Axis -5 degrees    Calculated R Axis -80 degrees    Calculated T Axis 5 degrees    Diagnosis       Normal sinus rhythm  Left anterior fascicular block  Nonspecific T wave abnormality  No previous ECGs available     BLOOD GAS, ARTERIAL    Collection Time: 05/25/21  9:15 PM   Result Value Ref Range    pH 7.44 7.35 - 7.45      PCO2 42 35 - 45 mmHg    PO2 62 (L) 80 - 100 mmHg    O2 SAT 93 92 - 97 %    BICARBONATE 28 (H) 22 - 26 mmol/L    BASE EXCESS 3.4 mmol/L    O2 METHOD ROOM AIR      Sample source ARTERIAL      SITE RIGHT BRACHIAL      ROSALBA'S TEST YES     POC TROPONIN-I    Collection Time: 05/25/21  9:50 PM   Result Value Ref Range    Troponin-I (POC) 0.06 0.00 - 0.08 ng/mL       Radiologic Studies -   CTA CHEST W OR W WO CONT   Final Result   1. No acute pulmonary embolism. 2. Mild patchy bilateral airspace opacity can be seen with nonspecific infection   or inflammation, including atypical/viral etiologies. CT Results  (Last 48 hours)               05/25/21 2135  CTA CHEST W OR W WO CONT Final result    Impression:  1. No acute pulmonary embolism. 2. Mild patchy bilateral airspace opacity can be seen with nonspecific infection   or inflammation, including atypical/viral etiologies. Narrative:  EXAM:  CTA CHEST W OR W WO CONT       INDICATION: Shortness of breath and hypoxia. Influenza A.       COMPARISON: Chest radiographs 1/20/2018. CT abdomen pelvis 5/19/2021. TECHNIQUE: Helical thin section chest CT following uneventful intravenous   administration of nonionic contrast according to departmental PE protocol. Coronal and sagittal reformats were performed. 3D/MIP post processing was   performed. CT dose reduction was achieved through use of a standardized protocol   tailored for this examination and automatic exposure control for dose   modulation. FINDINGS: This is a good quality study for the evaluation of pulmonary embolism   to the first subsegmental arterial level. There is no pulmonary embolism to this   level. The visualized thyroid gland is unremarkable. The aorta and main pulmonary   artery are normal in caliber. Cardiac size is within normal limits. No   pericardial effusion. No lymphadenopathy by imaging size criteria. There is mild patchy bilateral airspace opacity. There is no lung mass. No   pleural effusion or pneumothorax. Central airways are unremarkable. Limited images of the upper abdomen are within normal limits. The bony   structures are age-appropriate               CXR Results  (Last 48 hours)    None            Medical Decision Making   I am the first provider for this patient. I reviewed the vital signs, available nursing notes, past medical history, past surgical history, family history and social history. Vital Signs-Reviewed the patient's vital signs. Patient Vitals for the past 12 hrs:   Temp Pulse Resp BP SpO2   05/26/21 0014 98.2 °F (36.8 °C) 77 20  93 %   05/25/21 2230  83 24 119/77 90 %   05/25/21 2147    121/70 93 %   05/25/21 2019  86 16 108/76 93 %   05/25/21 1952 98 °F (36.7 °C) 92 20 121/79 96 %       Pulse Oximetry Analysis - 96% on RA Normal    Records Reviewed/Interpretted: Nursing Notes from triage and Old Medical Records, occluding to most recent ER visits with diagnosis of influenza A    Provider Notes (Medical Decision Making):     DDX:  Dehydration, lecture abnormality, PE, pneumonia, arrhythmia, viral cardiomyopathy    Plan:  EKG, labs, troponin, BNP, CTA, IV fluids, antispasmodic, Zofran as needed    Impression:  Influenza A, dehydration, diarrhea, shortness of breath, deconditioning    ED Course:   Initial assessment performed. The patients presenting problems have been discussed, and they are in agreement with the care plan formulated and outlined with them.   I have encouraged them to ask questions as they arise throughout their visit. I reviewed our electronic medical record system for any past medical records that were available that may contribute to the patients current condition, the nursing notes and and vital signs from today's visit  Nursing notes will be reviewed as they become available in realtime while the pt has been in the ED. Darvin Ghosh MD    I personally reviewed/interpreted pt's imaging. Agree with official read by radiology as noted above. Darvin Ghosh MD    PROGRESS NOTE  11:59 PM  Patient and family informed of negative CTA findings for PE, noted to have findings consistent with viral pneumonia. Procedure Note - Limited Bedside Ultrasound- Cardiac  12:00 AM  Performed by: Darvin Ghosh MD   Indication: Shortness of breath, concern for viral cardiomyopathy  Transthoracic bedside US was performed using the Parasternal, apical view. Interpretation:  Cardiac activity was appreciated showing normal activity. Pericardial fluid was not appreciated. The procedure took 1-15 minutes, and pt tolerated well. Darvin Ghosh MD                    Progress note:  Pt noted to be feeling better, ready for discharge. Discussed lab and imaging findings with pt, specifically noting no desat with ambulation around the ED, negative cta for pe. Pt will follow up with pcp as instructed. All questions have been answered, pt voiced understanding and agreement with plan. Specific return precautions provided in addition to instructions for pt to return to the ED immediately should sx worsen at any time. Darvin Ghosh MD             Critical Care Time:     none      Diagnosis     Clinical Impression:   1. Influenza A    2. SOB (shortness of breath)    3. ABRAHAM (dyspnea on exertion)    4. Diarrhea, unspecified type        PLAN:  1.    Discharge Medication List as of 5/26/2021  1:42 AM      START taking these medications    Details   diphenoxylate-atropine (LomotiL) 2.5-0.025 mg per tablet Take 1 Tablet by mouth four (4) times daily as needed for Diarrhea (1 tab after each stool for max 8 per day). Max Daily Amount: 4 Tablets. Take after each stool for a maximum of 8 tablets daily, Normal, Disp-20 Tablet, R-0      hyoscyamine SL (LEVSIN/SL) 0.125 mg SL tablet 1 Tablet by SubLINGual route every four (4) hours as needed for Cramping., Normal, Disp-10 Tablet, R-0      promethazine (PHENERGAN) 25 mg tablet Take 1 Tablet by mouth every six (6) hours as needed for Nausea., Normal, Disp-12 Tablet, R-0      predniSONE (STERAPRED DS) 10 mg dose pack Take as directed on packaging, Normal, Disp-48 Tablet, R-0      naproxen (NAPROSYN) 500 mg tablet Take 1 Tablet by mouth every twelve (12) hours as needed for Pain., Normal, Disp-20 Tablet, R-0         CONTINUE these medications which have NOT CHANGED    Details   ondansetron (Zofran ODT) 4 mg disintegrating tablet 1 Tablet by SubLINGual route every eight (8) hours as needed for Nausea or Vomiting., Normal, Disp-20 Tablet, R-0      dicyclomine (BENTYL) 20 mg tablet Take 1 Tablet by mouth every six (6) hours. , Normal, Disp-20 Tablet, R-0           2. Follow-up Information     Follow up With Specialties Details Why Contact Jean Wilson NP Nurse Practitioner Schedule an appointment as soon as possible for a visit in 2 days As needed, If symptoms worsen [address] [place] [address]  [phone]      [facility] EMERGENCY DEPT Emergency Medicine   [address] [place] [address]  [phone]        Return to ED if worse     Disposition:     The patient's results have been reviewed with family and/or caregiver.  They verbally convey their understanding and agreement of the patient's signs, symptoms, diagnosis, treatment and prognosis and additionally agree to follow up as recommended in the discharge instructions or to return to the Emergency Room should the patient's condition change prior to their follow-up appointment. The family and/or caregiver verbally agrees with the care-plan and all of their questions have been answered. The discharge instructions have also been provided to the them with educational information regarding the patient's diagnosis as well a list of reasons why the patient would want to return to the ER prior to their follow-up appointment should their condition change.   Shannon Yee MD

## 2021-05-25 NOTE — Clinical Note
[facility] EMERGENCY DEPT 
[address] [facility] [facility] [place] [address] 
[phone] Work/School Note Date: 5/25/2021 To Whom It May concern: 
 
Kaushik Cooney was seen and treated today in the emergency room by the following provider(s): 
Attending Provider: Heather Brian MD. Kaushik Cooney is excused from work/school on 5/26/2021 through 5/29/2021. He is medically clear to return to work/school on 5/30/2021.   
  
 
Sincerely, 
 
 
 
 
Shannon Yee MD

## 2021-05-26 VITALS
WEIGHT: 288.8 LBS | HEIGHT: 75 IN | BODY MASS INDEX: 35.91 KG/M2 | TEMPERATURE: 98.2 F | RESPIRATION RATE: 20 BRPM | HEART RATE: 77 BPM | OXYGEN SATURATION: 93 % | DIASTOLIC BLOOD PRESSURE: 77 MMHG | SYSTOLIC BLOOD PRESSURE: 119 MMHG

## 2021-05-26 LAB
ATRIAL RATE: 86 BPM
CALCULATED P AXIS, ECG09: -5 DEGREES
CALCULATED R AXIS, ECG10: -80 DEGREES
CALCULATED T AXIS, ECG11: 5 DEGREES
DIAGNOSIS, 93000: NORMAL
P-R INTERVAL, ECG05: 140 MS
Q-T INTERVAL, ECG07: 370 MS
QRS DURATION, ECG06: 106 MS
QTC CALCULATION (BEZET), ECG08: 442 MS
VENTRICULAR RATE, ECG03: 86 BPM

## 2021-05-26 PROCEDURE — 74011250636 HC RX REV CODE- 250/636: Performed by: EMERGENCY MEDICINE

## 2021-05-26 RX ORDER — DIPHENOXYLATE HYDROCHLORIDE AND ATROPINE SULFATE 2.5; .025 MG/1; MG/1
1 TABLET ORAL
Qty: 16 TABLET | Refills: 0 | Status: SHIPPED | OUTPATIENT
Start: 2021-05-26

## 2021-05-26 RX ORDER — HYOSCYAMINE SULFATE 0.12 MG/1
0.12 TABLET SUBLINGUAL
Qty: 10 TABLET | Refills: 0 | Status: SHIPPED | OUTPATIENT
Start: 2021-05-26

## 2021-05-26 RX ORDER — DIPHENOXYLATE HYDROCHLORIDE AND ATROPINE SULFATE 2.5; .025 MG/1; MG/1
1 TABLET ORAL
Qty: 20 TABLET | Refills: 0 | Status: SHIPPED | OUTPATIENT
Start: 2021-05-26 | End: 2021-05-26 | Stop reason: SDUPTHER

## 2021-05-26 RX ORDER — PREDNISONE 10 MG/1
TABLET ORAL
Qty: 48 TABLET | Refills: 0 | Status: SHIPPED | OUTPATIENT
Start: 2021-05-26 | End: 2021-05-26 | Stop reason: SDUPTHER

## 2021-05-26 RX ORDER — PROMETHAZINE HYDROCHLORIDE 25 MG/1
25 TABLET ORAL
Qty: 12 TABLET | Refills: 0 | Status: SHIPPED | OUTPATIENT
Start: 2021-05-26

## 2021-05-26 RX ORDER — NAPROXEN 500 MG/1
500 TABLET ORAL
Qty: 20 TABLET | Refills: 0 | Status: SHIPPED | OUTPATIENT
Start: 2021-05-26

## 2021-05-26 RX ORDER — PREDNISONE 10 MG/1
TABLET ORAL
Qty: 48 TABLET | Refills: 0 | Status: SHIPPED | OUTPATIENT
Start: 2021-05-26

## 2021-05-26 RX ADMIN — SODIUM CHLORIDE 1000 ML: 9 INJECTION, SOLUTION INTRAVENOUS at 00:05

## 2021-05-26 NOTE — ED NOTES
Patient came in with c/o being diagnosed with the FLU on friday, sx are SOB, cough and patient states that on ambulation or talking O2 sats drop to 87%. States he has been taking tylenol and Ibuprofen for pain and fever and zofran for nausea.

## 2021-05-26 NOTE — ED NOTES
Patient was provided with discharge instructions. Instructions and any medications were reviewed with the patient &/or family by Dr Wilda Kent. Questions and concerns addressed by the provider. Patient discharged in stable condition via ambulation and was escorted by family.

## 2021-05-26 NOTE — ED NOTES
Patient ambulated around the king, O2 stayed above 92% and pulse . Patient states he feels \"winded\" when he walks.

## 2022-03-20 ENCOUNTER — APPOINTMENT (OUTPATIENT)
Dept: CT IMAGING | Age: 47
End: 2022-03-20
Attending: EMERGENCY MEDICINE
Payer: COMMERCIAL

## 2022-03-20 ENCOUNTER — HOSPITAL ENCOUNTER (EMERGENCY)
Age: 47
Discharge: HOME | End: 2022-03-21
Attending: EMERGENCY MEDICINE
Payer: COMMERCIAL

## 2022-03-20 ENCOUNTER — APPOINTMENT (OUTPATIENT)
Dept: GENERAL RADIOLOGY | Age: 47
End: 2022-03-20
Attending: EMERGENCY MEDICINE
Payer: COMMERCIAL

## 2022-03-20 DIAGNOSIS — R07.9 ACUTE CHEST PAIN: ICD-10-CM

## 2022-03-20 DIAGNOSIS — R10.9 ACUTE ABDOMINAL PAIN: Primary | ICD-10-CM

## 2022-03-20 DIAGNOSIS — K52.9 GASTROENTERITIS, ACUTE: ICD-10-CM

## 2022-03-20 LAB
ALBUMIN SERPL-MCNC: 3.6 G/DL (ref 3.5–5)
ALBUMIN/GLOB SERPL: 1 {RATIO} (ref 1.1–2.2)
ALP SERPL-CCNC: 61 U/L (ref 45–117)
ALT SERPL-CCNC: 60 U/L (ref 12–78)
ANION GAP SERPL CALC-SCNC: 3 MMOL/L (ref 5–15)
AST SERPL-CCNC: 32 U/L (ref 15–37)
BASOPHILS # BLD: 0 K/UL (ref 0–0.1)
BASOPHILS NFR BLD: 0 % (ref 0–1)
BILIRUB SERPL-MCNC: 1 MG/DL (ref 0.2–1)
BNP SERPL-MCNC: 16 PG/ML
BUN SERPL-MCNC: 16 MG/DL (ref 6–20)
BUN/CREAT SERPL: 13 (ref 12–20)
CALCIUM SERPL-MCNC: 8.8 MG/DL (ref 8.5–10.1)
CHLORIDE SERPL-SCNC: 103 MMOL/L (ref 97–108)
CO2 SERPL-SCNC: 29 MMOL/L (ref 21–32)
COMMENT, HOLDF: NORMAL
CREAT SERPL-MCNC: 1.21 MG/DL (ref 0.7–1.3)
DIFFERENTIAL METHOD BLD: ABNORMAL
EOSINOPHIL # BLD: 0.1 K/UL (ref 0–0.4)
EOSINOPHIL NFR BLD: 1 % (ref 0–7)
ERYTHROCYTE [DISTWIDTH] IN BLOOD BY AUTOMATED COUNT: 14.2 % (ref 11.5–14.5)
GLOBULIN SER CALC-MCNC: 3.7 G/DL (ref 2–4)
GLUCOSE SERPL-MCNC: 121 MG/DL (ref 65–100)
HCT VFR BLD AUTO: 54 % (ref 36.6–50.3)
HGB BLD-MCNC: 18.7 G/DL (ref 12.1–17)
IMM GRANULOCYTES # BLD AUTO: 0 K/UL (ref 0–0.04)
IMM GRANULOCYTES NFR BLD AUTO: 0 % (ref 0–0.5)
LIPASE SERPL-CCNC: 41 U/L (ref 73–393)
LYMPHOCYTES # BLD: 0.5 K/UL (ref 0.8–3.5)
LYMPHOCYTES NFR BLD: 5 % (ref 12–49)
MCH RBC QN AUTO: 31.8 PG (ref 26–34)
MCHC RBC AUTO-ENTMCNC: 34.6 G/DL (ref 30–36.5)
MCV RBC AUTO: 91.8 FL (ref 80–99)
MONOCYTES # BLD: 0.5 K/UL (ref 0–1)
MONOCYTES NFR BLD: 5 % (ref 5–13)
NEUTS SEG # BLD: 9.3 K/UL (ref 1.8–8)
NEUTS SEG NFR BLD: 89 % (ref 32–75)
NRBC # BLD: 0 K/UL (ref 0–0.01)
NRBC BLD-RTO: 0 PER 100 WBC
PLATELET # BLD AUTO: 169 K/UL (ref 150–400)
PMV BLD AUTO: 11.1 FL (ref 8.9–12.9)
POTASSIUM SERPL-SCNC: 4.4 MMOL/L (ref 3.5–5.1)
PROT SERPL-MCNC: 7.3 G/DL (ref 6.4–8.2)
RBC # BLD AUTO: 5.88 M/UL (ref 4.1–5.7)
RBC MORPH BLD: ABNORMAL
SAMPLES BEING HELD,HOLD: NORMAL
SODIUM SERPL-SCNC: 135 MMOL/L (ref 136–145)
TROPONIN-HIGH SENSITIVITY: 10 NG/L (ref 0–76)
WBC # BLD AUTO: 10.4 K/UL (ref 4.1–11.1)

## 2022-03-20 PROCEDURE — 71046 X-RAY EXAM CHEST 2 VIEWS: CPT

## 2022-03-20 PROCEDURE — 83880 ASSAY OF NATRIURETIC PEPTIDE: CPT

## 2022-03-20 PROCEDURE — 99285 EMERGENCY DEPT VISIT HI MDM: CPT

## 2022-03-20 PROCEDURE — 85025 COMPLETE CBC W/AUTO DIFF WBC: CPT

## 2022-03-20 PROCEDURE — 36415 COLL VENOUS BLD VENIPUNCTURE: CPT

## 2022-03-20 PROCEDURE — 83690 ASSAY OF LIPASE: CPT

## 2022-03-20 PROCEDURE — 96374 THER/PROPH/DIAG INJ IV PUSH: CPT

## 2022-03-20 PROCEDURE — 74177 CT ABD & PELVIS W/CONTRAST: CPT

## 2022-03-20 PROCEDURE — 74011000636 HC RX REV CODE- 636: Performed by: EMERGENCY MEDICINE

## 2022-03-20 PROCEDURE — 80053 COMPREHEN METABOLIC PANEL: CPT

## 2022-03-20 PROCEDURE — 84484 ASSAY OF TROPONIN QUANT: CPT

## 2022-03-20 PROCEDURE — 93005 ELECTROCARDIOGRAM TRACING: CPT

## 2022-03-20 PROCEDURE — 74011250636 HC RX REV CODE- 250/636: Performed by: EMERGENCY MEDICINE

## 2022-03-20 RX ORDER — ONDANSETRON 2 MG/ML
4 INJECTION INTRAMUSCULAR; INTRAVENOUS ONCE
Status: COMPLETED | OUTPATIENT
Start: 2022-03-20 | End: 2022-03-20

## 2022-03-20 RX ADMIN — ONDANSETRON 4 MG: 2 INJECTION INTRAMUSCULAR; INTRAVENOUS at 23:22

## 2022-03-20 RX ADMIN — SODIUM CHLORIDE 1000 ML: 9 INJECTION, SOLUTION INTRAVENOUS at 23:22

## 2022-03-20 RX ADMIN — IOPAMIDOL 100 ML: 755 INJECTION, SOLUTION INTRAVENOUS at 23:14

## 2022-03-20 NOTE — Clinical Note
[address]  \[facility]\"" EMERGENCY DEPT  [address] [place] [address]  [phone]    Work/School Note    Date: 3/20/2022    To Whom It May concern:    Sharon Gutierrez was seen and treated today in the emergency room by the following provider(s):  Attending Provider: Christina Harris MD.      Sharon Gutierrez is excused from work/school on 03/21/22 and 03/22/22. He is medically clear to return to work/school on 3/23/2022.        Sincerely,          Ron Neely MD

## 2022-03-21 VITALS
OXYGEN SATURATION: 92 % | RESPIRATION RATE: 18 BRPM | HEART RATE: 89 BPM | WEIGHT: 294.98 LBS | SYSTOLIC BLOOD PRESSURE: 152 MMHG | TEMPERATURE: 98 F | HEIGHT: 75 IN | BODY MASS INDEX: 36.68 KG/M2 | DIASTOLIC BLOOD PRESSURE: 86 MMHG

## 2022-03-21 LAB
ATRIAL RATE: 99 BPM
CALCULATED P AXIS, ECG09: 26 DEGREES
CALCULATED R AXIS, ECG10: -65 DEGREES
CALCULATED T AXIS, ECG11: 31 DEGREES
DIAGNOSIS, 93000: NORMAL
P-R INTERVAL, ECG05: 136 MS
Q-T INTERVAL, ECG07: 350 MS
QRS DURATION, ECG06: 116 MS
QTC CALCULATION (BEZET), ECG08: 449 MS
VENTRICULAR RATE, ECG03: 99 BPM

## 2022-03-21 PROCEDURE — 74011000250 HC RX REV CODE- 250: Performed by: EMERGENCY MEDICINE

## 2022-03-21 PROCEDURE — 74011250637 HC RX REV CODE- 250/637: Performed by: EMERGENCY MEDICINE

## 2022-03-21 RX ORDER — LOPERAMIDE HYDROCHLORIDE 2 MG/1
2 CAPSULE ORAL
Qty: 20 CAPSULE | Refills: 0 | Status: SHIPPED | OUTPATIENT
Start: 2022-03-21 | End: 2022-03-31

## 2022-03-21 RX ORDER — DICYCLOMINE HYDROCHLORIDE 20 MG/1
20 TABLET ORAL
Status: COMPLETED | OUTPATIENT
Start: 2022-03-21 | End: 2022-03-21

## 2022-03-21 RX ORDER — ONDANSETRON 4 MG/1
4 TABLET, FILM COATED ORAL
Qty: 20 TABLET | Refills: 0 | Status: SHIPPED | OUTPATIENT
Start: 2022-03-21

## 2022-03-21 RX ORDER — DICYCLOMINE HYDROCHLORIDE 20 MG/1
20 TABLET ORAL EVERY 6 HOURS
Qty: 20 TABLET | Refills: 0 | Status: SHIPPED | OUTPATIENT
Start: 2022-03-21

## 2022-03-21 RX ADMIN — LIDOCAINE HYDROCHLORIDE 40 ML: 20 SOLUTION ORAL; TOPICAL at 00:48

## 2022-03-21 RX ADMIN — DICYCLOMINE HYDROCHLORIDE 20 MG: 20 TABLET ORAL at 00:47

## 2022-03-21 NOTE — DISCHARGE INSTRUCTIONS
It was a pleasure taking care of you at [facility] Emergency Department today. We know that when you come to [facility], you are entrusting us with your health, comfort, and safety. Our physicians and nurses honor that trust, and we truly appreciate the opportunity to care for you and your loved ones. We also value your feedback. If you receive a survey about your Emergency Department experience today, please fill it out. We care about our patients' feedback, and we listen to what you have to say. Thank you!

## 2022-03-21 NOTE — ED PROVIDER NOTES
EMERGENCY DEPARTMENT HISTORY AND PHYSICAL EXAM      Date: 3/20/2022  Patient Name: Barber Corrales    History of Presenting Illness     Chief Complaint   Patient presents with    Chest Pain     Reports acute onset of epigastric pain that radiates to chest x 4 hours. Denied SOB and denied significant cardiac hx. History Provided By: Patient    HPI: Barber Corrales, 55 y.o. male with a past medical history significant for Anxiety, depression, GERD, hypertension, medical problems as stated below presents to the ED with cc of moderate to severe upper abdominal pain radiating to the chest nausea, vomiting, diarrhea over the last 10 hours. Patient reports symptoms began slightly after eating lunch or late breakfast this morning around 11 AM.  They progressed from abdominal pain up into the chest and left shoulder. Patient reports vomiting x2 and having 2 watery episodes of brown stool. Patient reports eating a [employer] but nobody else got sick at that time. Patient became sweaty and clammy well in the waiting room today. He does report feeling better after vomiting. No other associated symptoms. No other exacerbating ameliorating factors. There are no other complaints, changes, or physical findings at this time. PCP: Priyank Rivas NP    No current facility-administered medications on file prior to encounter. Current Outpatient Medications on File Prior to Encounter   Medication Sig Dispense Refill    hyoscyamine SL (LEVSIN/SL) 0.125 mg SL tablet 1 Tablet by SubLINGual route every four (4) hours as needed for Cramping. 10 Tablet 0    promethazine (PHENERGAN) 25 mg tablet Take 1 Tablet by mouth every six (6) hours as needed for Nausea. 12 Tablet 0    naproxen (NAPROSYN) 500 mg tablet Take 1 Tablet by mouth every twelve (12) hours as needed for Pain.  20 Tablet 0    diphenoxylate-atropine (LomotiL) 2.5-0.025 mg per tablet Take 1 Tablet by mouth four (4) times daily as needed for Diarrhea (1 tab after each stool for max 8 per day). Max Daily Amount: 8 Tablets. Take after each stool for a maximum of 8 tablets daily. 16 Tablet 0    predniSONE (STERAPRED DS) 10 mg dose pack Take as directed on packaging 48 Tablet 0    ondansetron (Zofran ODT) 4 mg disintegrating tablet 1 Tablet by SubLINGual route every eight (8) hours as needed for Nausea or Vomiting. 20 Tablet 0    [DISCONTINUED] dicyclomine (BENTYL) 20 mg tablet Take 1 Tablet by mouth every six (6) hours. 20 Tablet 0       Past History     Past Medical History:  Past Medical History:   Diagnosis Date    Anxiety     Depression     depression and adhd    GERD (gastroesophageal reflux disease)     Hypercholesterolemia        Past Surgical History:  Past Surgical History:   Procedure Laterality Date    HX VASECTOMY      2003       Family History:  Family History   Problem Relation Age of Onset    Cancer Father     Elevated Lipids Mother     Cancer Maternal Grandfather        Social History:  Social History     Tobacco Use    Smoking status: Never Smoker    Smokeless tobacco: Never Used   Substance Use Topics    Alcohol use: Yes     Alcohol/week: 1.0 standard drink     Types: 1 Shots of liquor per week     Comment: social    Drug use: No       Allergies:  No Known Allergies      Review of Systems   Review of Systems   Constitutional: Negative for chills, diaphoresis, fatigue and fever. HENT: Negative for ear pain and sore throat. Eyes: Negative for pain and redness. Respiratory: Negative for cough and shortness of breath. Cardiovascular: Positive for chest pain. Negative for leg swelling. Gastrointestinal: Positive for abdominal pain, diarrhea, nausea and vomiting. Endocrine: Negative for cold intolerance and heat intolerance. Genitourinary: Negative for flank pain and hematuria. Musculoskeletal: Negative for back pain and neck stiffness. Skin: Negative for rash and wound.    Neurological: Negative for dizziness, syncope and headaches. All other systems reviewed and are negative. Physical Exam   Physical Exam  Vitals and nursing note reviewed. Constitutional:       General: He is not in acute distress. Appearance: He is well-developed. He is not ill-appearing. HENT:      Head: Normocephalic and atraumatic. Mouth/Throat:      Mouth: Mucous membranes are dry. Pharynx: No oropharyngeal exudate. Eyes:      Conjunctiva/sclera: Conjunctivae normal.      Pupils: Pupils are equal, round, and reactive to light. Cardiovascular:      Rate and Rhythm: Normal rate and regular rhythm. Heart sounds: No murmur heard. Pulmonary:      Effort: Pulmonary effort is normal. No respiratory distress. Breath sounds: Normal breath sounds. No wheezing. Abdominal:      General: Bowel sounds are normal. There is no distension. Palpations: Abdomen is soft. Tenderness: There is abdominal tenderness in the right upper quadrant and epigastric area. There is no right CVA tenderness, left CVA tenderness, guarding or rebound. Positive signs include Sandoval's sign. Negative signs include McBurney's sign. Positive Rovsing's sign: . CBCABDOMINALPAIN. Musculoskeletal:         General: No deformity. Normal range of motion. Cervical back: Normal range of motion. Skin:     General: Skin is warm and dry. Findings: No rash. Neurological:      Mental Status: He is alert and oriented to person, place, and time.       Coordination: Coordination normal.   Psychiatric:         Behavior: Behavior normal.         Diagnostic Study Results     Labs -     Recent Results (from the past 24 hour(s))   EKG, 12 LEAD, INITIAL    Collection Time: 03/20/22 10:11 PM   Result Value Ref Range    Ventricular Rate 97 BPM    Atrial Rate 97 BPM    P-R Interval 124 ms    QRS Duration 110 ms    Q-T Interval 358 ms    QTC Calculation (Bezet) 454 ms    Calculated R Axis -72 degrees    Calculated T Axis 12 degrees Diagnosis       ** Poor data quality, interpretation may be adversely affected  Sinus rhythm with occasional premature ventricular complexes  Pulmonary disease pattern  Left anterior fascicular block  When compared with ECG of 25-MAY-2021 20:51,  premature ventricular complexes are now present  Nonspecific T wave abnormality, improved in Anterior leads     CBC WITH AUTOMATED DIFF    Collection Time: 03/20/22 10:50 PM   Result Value Ref Range    WBC 10.4 4.1 - 11.1 K/uL    RBC 5.88 (H) 4.10 - 5.70 M/uL    HGB 18.7 (H) 12.1 - 17.0 g/dL    HCT 54.0 (H) 36.6 - 50.3 %    MCV 91.8 80.0 - 99.0 FL    MCH 31.8 26.0 - 34.0 PG    MCHC 34.6 30.0 - 36.5 g/dL    RDW 14.2 11.5 - 14.5 %    PLATELET 485 964 - 655 K/uL    MPV 11.1 8.9 - 12.9 FL    NRBC 0.0 0  WBC    ABSOLUTE NRBC 0.00 0.00 - 0.01 K/uL    NEUTROPHILS 89 (H) 32 - 75 %    LYMPHOCYTES 5 (L) 12 - 49 %    MONOCYTES 5 5 - 13 %    EOSINOPHILS 1 0 - 7 %    BASOPHILS 0 0 - 1 %    IMMATURE GRANULOCYTES 0 0.0 - 0.5 %    ABS. NEUTROPHILS 9.3 (H) 1.8 - 8.0 K/UL    ABS. LYMPHOCYTES 0.5 (L) 0.8 - 3.5 K/UL    ABS. MONOCYTES 0.5 0.0 - 1.0 K/UL    ABS. EOSINOPHILS 0.1 0.0 - 0.4 K/UL    ABS. BASOPHILS 0.0 0.0 - 0.1 K/UL    ABS. IMM. GRANS. 0.0 0.00 - 0.04 K/UL    DF SMEAR SCANNED      RBC COMMENTS NORMOCYTIC, NORMOCHROMIC     METABOLIC PANEL, COMPREHENSIVE    Collection Time: 03/20/22 10:50 PM   Result Value Ref Range    Sodium 135 (L) 136 - 145 mmol/L    Potassium 4.4 3.5 - 5.1 mmol/L    Chloride 103 97 - 108 mmol/L    CO2 29 21 - 32 mmol/L    Anion gap 3 (L) 5 - 15 mmol/L    Glucose 121 (H) 65 - 100 mg/dL    BUN 16 6 - 20 MG/DL    Creatinine 1.21 0.70 - 1.30 MG/DL    BUN/Creatinine ratio 13 12 - 20      GFR est AA >60 >60 ml/min/1.73m2    GFR est non-AA >60 >60 ml/min/1.73m2    Calcium 8.8 8.5 - 10.1 MG/DL    Bilirubin, total 1.0 0.2 - 1.0 MG/DL    ALT (SGPT) 60 12 - 78 U/L    AST (SGOT) 32 15 - 37 U/L    Alk.  phosphatase 61 45 - 117 U/L    Protein, total 7.3 6.4 - 8.2 g/dL Albumin 3.6 3.5 - 5.0 g/dL    Globulin 3.7 2.0 - 4.0 g/dL    A-G Ratio 1.0 (L) 1.1 - 2.2     NT-PRO BNP    Collection Time: 03/20/22 10:50 PM   Result Value Ref Range    NT pro-BNP 16 <125 PG/ML   TROPONIN-HIGH SENSITIVITY    Collection Time: 03/20/22 10:50 PM   Result Value Ref Range    Troponin-High Sensitivity 10 0 - 76 ng/L   SAMPLES BEING HELD    Collection Time: 03/20/22 10:50 PM   Result Value Ref Range    SAMPLES BEING HELD  PST     COMMENT        Add-on orders for these samples will be processed based on acceptable specimen integrity and analyte stability, which may vary by analyte. LIPASE    Collection Time: 03/20/22 10:50 PM   Result Value Ref Range    Lipase 41 (L) 73 - 393 U/L       Radiologic Studies -   CT ABD PELV W CONT   Final Result   No evidence of acute abdominal or pelvic process. No significant change from the   prior study. XR CHEST PA LAT   Final Result   No acute process. CT Results  (Last 48 hours)               03/20/22 2333  CT ABD PELV W CONT Final result    Impression:  No evidence of acute abdominal or pelvic process. No significant change from the   prior study. Narrative:  EXAM: CT ABD PELV W CONT       INDICATION: Diffuse upper abdominal pain        COMPARISON: 5/19/2021        CONTRAST: 100 mL of Isovue-370. ORAL CONTRAST: None       TECHNIQUE:    Following the uneventful intravenous administration of contrast, thin axial   images were obtained through the abdomen and pelvis. Coronal and sagittal   reconstructions were generated. CT dose reduction was achieved through use of a   standardized protocol tailored for this examination and automatic exposure   control for dose modulation. FINDINGS:    LOWER THORAX: No significant abnormality in the incidentally imaged lower chest.   LIVER: No mass. BILIARY TREE: Gallbladder is within normal limits. CBD is not dilated. SPLEEN: Mildly enlarged but unchanged.    PANCREAS: No mass or ductal dilatation. ADRENALS: Unremarkable. KIDNEYS: No mass, calculus, or hydronephrosis. STOMACH: Unremarkable. SMALL BOWEL: No dilatation or wall thickening. COLON: No dilatation or wall thickening. APPENDIX: Normal   PERITONEUM: No ascites or pneumoperitoneum. RETROPERITONEUM: No lymphadenopathy or aortic aneurysm. REPRODUCTIVE ORGANS: Unremarkable. URINARY BLADDER: No mass or calculus. BONES: No destructive bone lesion. ABDOMINAL WALL: No mass or hernia. ADDITIONAL COMMENTS: N/A               CXR Results  (Last 48 hours)               03/20/22 2227  XR CHEST PA LAT Final result    Impression:  No acute process. Narrative:  INDICATION: Chest pain       COMPARISON: January 20, 2018       FINDINGS: PA and lateral views of the chest demonstrate a stable   cardiomediastinal silhouette and clear lungs bilaterally. The visualized osseous   structures are unremarkable. Medical Decision Making   I am the first provider for this patient. I reviewed the vital signs, available nursing notes, past medical history, past surgical history, family history and social history. Vital Signs-Reviewed the patient's vital signs. Patient Vitals for the past 12 hrs:   Temp Pulse Resp BP SpO2   03/21/22 0045  87 22 (!) 142/78 91 %   03/21/22 0000  92 15  90 %   03/20/22 2315  93 22 (!) 149/91 92 %   03/20/22 2152 98 °F (36.7 °C) 100 18 (!) 164/95 96 %       Records Reviewed: Nursing records and medical records reviewed    MDM:  Pt presents with acute abdominal pain; vital signs stable with currently a non-peritoneal exam; DDx includes: Gastroenteritis, hepatitis, pancreatitis, obstruction, appendicitis, viral illness, IBD, diverticulitis, mesenteric ischemia, AAA or descending dissection, ACS, kidney stone. Will get labs, treat symptomatically and obtain serial abdominal exams to determine if a CT is warranted. Will reassess and monitor closely.        Provider Notes (Medical Decision Making):   Patient is a 42-year-old male presenting with severe upper abdominal pain, nausea, vomiting, diarrhea. Pain radiating to his chest as well. On my evaluation, patient had significantly improved with his chest pain. Troponin showed no signs of ischemia his EKG was essentially unchanged from prior. Patient symptoms completely resolved with GI cocktail and Bentyl. Feel much better after IV fluids and Zofran. This time I think this is likely a GI source with symptoms and I will have him follow-up with his primary care doctor in 2 days. He will return with any worsening symptoms. ED Course:   Initial assessment performed. The patients presenting problems have been discussed, and they are in agreement with the care plan formulated and outlined with them. I have encouraged them to ask questions as they arise throughout their visit. Medications Administered     iopamidoL (ISOVUE-370) 76 % injection 100 mL     Admin Date  03/20/2022 Action  Given Dose  100 mL Route  IntraVENous Administered By  [facility] Center[facility]orenc[facility] VELÁSQUEZ          ondansetron [facility]) injection 4 mg     Admin Date  03/20/2022 Action  Given Dose  4 mg Route  IntraVENous Administered By  Ciera Devlin RN          sodium chloride 0.9 % bolus infusion 1,000 mL     Admin Date  03/20/2022 Action  New Bag Dose  1,000 mL Rate  1,000 mL/hr Route  IntraVENous Administered By  Ciera Devlin RN                    Critical Care:  None      Disposition:  1:16 AM  Gigi Madrid  results have been reviewed with him. He has been counseled regarding his diagnosis. He verbally conveys understanding and agreement of the signs, symptoms, diagnosis, treatment and prognosis and additionally agrees to follow up as recommended with Dr. Priyank Rivas NP in 24 - 48 hours. He also agrees with the care-plan and conveys that all of his questions have been answered.   I have also put together some discharge instructions for him that include: 1) educational information regarding their diagnosis, 2) how to care for their diagnosis at home, as well a 3) list of reasons why they would want to return to the ED prior to their follow-up appointment, should their condition change. DISCHARGE PLAN:  1. Current Discharge Medication List      START taking these medications    Details   ondansetron hcl (Zofran) 4 mg tablet Take 1 Tablet by mouth every eight (8) hours as needed for Nausea. Qty: 20 Tablet, Refills: 0  Start date: 3/21/2022      loperamide (IMODIUM) 2 mg capsule Take 1 Capsule by mouth four (4) times daily as needed for Diarrhea for up to 10 days. Qty: 20 Capsule, Refills: 0  Start date: 3/21/2022, End date: 3/31/2022         CONTINUE these medications which have CHANGED    Details   dicyclomine (BENTYL) 20 mg tablet Take 1 Tablet by mouth every six (6) hours. Qty: 20 Tablet, Refills: 0  Start date: 3/21/2022           2. Follow-up Information     Follow up With Specialties Details Why Contact Jean Duncan NP Nurse Practitioner In 3 days For a follow-up evaluation. [address] [facility]  [phone]      [facility] EMERGENCY DEPT Emergency Medicine In 2 days If symptoms worsen [address]  [address] [facility]  [phone]        3. Return to ED if worse     Diagnosis     Clinical Impression:   1. Acute abdominal pain    2. Gastroenteritis, acute    3. Acute chest pain        Attestations:    Hair Ramsay MD    Please note that this dictation was completed with [employer], the computer voice recognition software. Quite often unanticipated grammatical, syntax, homophones, and other interpretive errors are inadvertently transcribed by the computer software. Please disregard these errors. Please excuse any errors that have escaped final proofreading. Thank you.